# Patient Record
Sex: FEMALE | Race: WHITE | NOT HISPANIC OR LATINO | Employment: FULL TIME | URBAN - METROPOLITAN AREA
[De-identification: names, ages, dates, MRNs, and addresses within clinical notes are randomized per-mention and may not be internally consistent; named-entity substitution may affect disease eponyms.]

---

## 2023-03-08 ENCOUNTER — TELEPHONE (OUTPATIENT)
Dept: PRIMARY CARE | Facility: CLINIC | Age: 38
End: 2023-03-08

## 2023-03-08 PROBLEM — R00.2 PALPITATION: Status: ACTIVE | Noted: 2023-03-08

## 2023-03-08 PROBLEM — F17.200 NICOTINE DEPENDENCE: Status: ACTIVE | Noted: 2023-03-08

## 2023-03-08 PROBLEM — E78.5 HYPERLIPIDEMIA: Status: ACTIVE | Noted: 2023-03-08

## 2023-03-08 PROBLEM — E55.9 VITAMIN D INSUFFICIENCY: Status: ACTIVE | Noted: 2023-03-08

## 2023-03-08 PROBLEM — F41.9 ANXIETY AND DEPRESSION: Status: ACTIVE | Noted: 2023-03-08

## 2023-03-08 PROBLEM — F32.A ANXIETY AND DEPRESSION: Status: ACTIVE | Noted: 2023-03-08

## 2023-03-08 PROBLEM — J01.90 ACUTE SINUSITIS: Status: ACTIVE | Noted: 2023-03-08

## 2023-03-08 RX ORDER — ERGOCALCIFEROL 1.25 MG/1
1.25 CAPSULE ORAL
COMMUNITY
Start: 2022-12-28 | End: 2023-07-20 | Stop reason: SDUPTHER

## 2023-03-08 RX ORDER — DOXYCYCLINE HYCLATE 100 MG
100 TABLET ORAL 2 TIMES DAILY
COMMUNITY
Start: 2023-02-24 | End: 2023-06-12 | Stop reason: ALTCHOICE

## 2023-03-08 RX ORDER — TOPIRAMATE 25 MG/1
25 TABLET ORAL 2 TIMES DAILY
COMMUNITY
Start: 2023-01-29 | End: 2023-06-12 | Stop reason: SDUPTHER

## 2023-03-08 RX ORDER — ATORVASTATIN CALCIUM 20 MG/1
20 TABLET, FILM COATED ORAL NIGHTLY
COMMUNITY
Start: 2022-12-29 | End: 2023-06-12 | Stop reason: SINTOL

## 2023-03-14 RX ORDER — VENLAFAXINE HYDROCHLORIDE 75 MG/1
75 CAPSULE, EXTENDED RELEASE ORAL DAILY
COMMUNITY
Start: 2023-01-29 | End: 2023-06-12 | Stop reason: SDUPTHER

## 2023-03-14 RX ORDER — PRAVASTATIN SODIUM 40 MG/1
40 TABLET ORAL NIGHTLY
COMMUNITY
End: 2023-06-12 | Stop reason: SDUPTHER

## 2023-03-16 NOTE — TELEPHONE ENCOUNTER
When did she start having the problems? She started the Atorvastatin in December. If her stomach issues started recently, I think it may have been the antibiotic she got in urgent care that caused it.   Please clarify.   The reason I switched is that Pravastatin was not helping her Triglycerides get to goal. If she really feels she does not want to try a different statin and the symptoms started before she got sick in February, then I will send in the Pravastatin until her next appointment. Rosuvastatin or Simvastatin would be more likely to help Triglycerides than Pravastatin and would prefer to try them if the symptoms started when started the Atorvastatin.  Please clarify with her and let me know.

## 2023-06-12 ENCOUNTER — OFFICE VISIT (OUTPATIENT)
Dept: PRIMARY CARE | Facility: CLINIC | Age: 38
End: 2023-06-12
Payer: COMMERCIAL

## 2023-06-12 VITALS
DIASTOLIC BLOOD PRESSURE: 72 MMHG | WEIGHT: 236.6 LBS | TEMPERATURE: 97.5 F | RESPIRATION RATE: 17 BRPM | HEART RATE: 84 BPM | OXYGEN SATURATION: 95 % | BODY MASS INDEX: 40.39 KG/M2 | SYSTOLIC BLOOD PRESSURE: 106 MMHG | HEIGHT: 64 IN

## 2023-06-12 DIAGNOSIS — G43.109 MIGRAINE WITH AURA AND WITHOUT STATUS MIGRAINOSUS, NOT INTRACTABLE: ICD-10-CM

## 2023-06-12 DIAGNOSIS — E55.9 VITAMIN D INSUFFICIENCY: ICD-10-CM

## 2023-06-12 DIAGNOSIS — F17.219 CIGARETTE NICOTINE DEPENDENCE WITH NICOTINE-INDUCED DISORDER: ICD-10-CM

## 2023-06-12 DIAGNOSIS — E78.2 MIXED HYPERLIPIDEMIA: Primary | ICD-10-CM

## 2023-06-12 DIAGNOSIS — R79.89 ELEVATED CORTISOL LEVEL: ICD-10-CM

## 2023-06-12 DIAGNOSIS — F41.9 ANXIETY AND DEPRESSION: ICD-10-CM

## 2023-06-12 DIAGNOSIS — F32.A ANXIETY AND DEPRESSION: ICD-10-CM

## 2023-06-12 PROBLEM — J01.90 ACUTE SINUSITIS: Status: RESOLVED | Noted: 2023-03-08 | Resolved: 2023-06-12

## 2023-06-12 PROBLEM — N76.4 VULVAR FURUNCLE: Status: RESOLVED | Noted: 2023-06-12 | Resolved: 2023-06-12

## 2023-06-12 PROCEDURE — 99214 OFFICE O/P EST MOD 30 MIN: CPT | Performed by: FAMILY MEDICINE

## 2023-06-12 PROCEDURE — 4004F PT TOBACCO SCREEN RCVD TLK: CPT | Performed by: FAMILY MEDICINE

## 2023-06-12 RX ORDER — RIZATRIPTAN BENZOATE 10 MG/1
10 TABLET ORAL ONCE AS NEEDED
Qty: 9 TABLET | Refills: 2 | Status: SHIPPED | OUTPATIENT
Start: 2023-06-12 | End: 2023-07-20 | Stop reason: SINTOL

## 2023-06-12 RX ORDER — VENLAFAXINE HYDROCHLORIDE 75 MG/1
75 CAPSULE, EXTENDED RELEASE ORAL DAILY
Qty: 90 CAPSULE | Refills: 3 | Status: SHIPPED | OUTPATIENT
Start: 2023-06-12 | End: 2023-07-20 | Stop reason: DRUGHIGH

## 2023-06-12 RX ORDER — PRAVASTATIN SODIUM 40 MG/1
40 TABLET ORAL NIGHTLY
Qty: 90 TABLET | Refills: 0 | Status: SHIPPED | OUTPATIENT
Start: 2023-06-12 | End: 2023-07-20 | Stop reason: SDUPTHER

## 2023-06-12 RX ORDER — TOPIRAMATE 25 MG/1
TABLET ORAL
Qty: 90 TABLET | Refills: 0 | Status: SHIPPED | OUTPATIENT
Start: 2023-06-12 | End: 2023-07-20 | Stop reason: SDUPTHER

## 2023-06-12 ASSESSMENT — PATIENT HEALTH QUESTIONNAIRE - PHQ9
SUM OF ALL RESPONSES TO PHQ9 QUESTIONS 1 AND 2: 0
1. LITTLE INTEREST OR PLEASURE IN DOING THINGS: NOT AT ALL
2. FEELING DOWN, DEPRESSED OR HOPELESS: NOT AT ALL

## 2023-06-12 ASSESSMENT — ENCOUNTER SYMPTOMS
APPETITE CHANGE: 0
FATIGUE: 0
JOINT SWELLING: 0
TROUBLE SWALLOWING: 0
UNEXPECTED WEIGHT CHANGE: 0
HEADACHES: 1
POLYDIPSIA: 0
LIGHT-HEADEDNESS: 0
MYALGIAS: 0
NAUSEA: 0
SHORTNESS OF BREATH: 0
OCCASIONAL FEELINGS OF UNSTEADINESS: 0
LOSS OF SENSATION IN FEET: 0
POLYPHAGIA: 0
DIARRHEA: 0
VOMITING: 0
PALPITATIONS: 0
DIZZINESS: 0
DEPRESSION: 0

## 2023-06-12 ASSESSMENT — COLUMBIA-SUICIDE SEVERITY RATING SCALE - C-SSRS
6. HAVE YOU EVER DONE ANYTHING, STARTED TO DO ANYTHING, OR PREPARED TO DO ANYTHING TO END YOUR LIFE?: NO
1. IN THE PAST MONTH, HAVE YOU WISHED YOU WERE DEAD OR WISHED YOU COULD GO TO SLEEP AND NOT WAKE UP?: NO
2. HAVE YOU ACTUALLY HAD ANY THOUGHTS OF KILLING YOURSELF?: NO

## 2023-06-12 NOTE — PROGRESS NOTES
Subjective   Patient ID: Moni Bui is a 37 y.o. female who presents for Follow-up (6 month follow up and labs).    Here for 6 month follow up. Hyperlipidemia, Vit D follow up.     Hyperlipidemia - switched to Atorvastatin from Pravastatin last visit due to TG over 200. She did not like it and changes self back to Pravastatin. Atorvastatin caused constipation that was awful.     Elevated Alk phos 6 months ago, was recently postpartum. Needs repeat. Had repeat CMP, lipd and vitamin D ordered but did not get done.     Vitamin D was 24 and switched to 20235 weekly. She was to have labs done.     Migraines - on Topamax. Wants to increase it but not had labs to monitor it done. Headaches still twice a week and not on medication to treat. Taking 2 tylenol and 3 ibuprofen for them. Never been on triptan. She would like to go up on it.     Tobacco - was not ready to quit last visit. Smoking down to 1/2 ppd. Is considering quitting.     Increased cortisol - was referred to Endocrinologist in December.They did not do anything else about it. Told her to lose weight. Was seen in [place]. Told her cortisol levels would drop if lose weight. Has lost 10-12 lbs.     Obesity - she started on Weight Watchers. Is limiting carbs. Feels like she gets lot of exercise at home and with children.                  Current Outpatient Medications:     ergocalciferol (Vitamin D-2) 1.25 MG (78697 UT) capsule, Take 1 capsule (1,250 mcg) by mouth every 7 days., Disp: , Rfl:     pravastatin (Pravachol) 40 mg tablet, Take 1 tablet (40 mg) by mouth once daily at bedtime., Disp: 90 tablet, Rfl: 0    rizatriptan (Maxalt) 10 mg tablet, Take 1 tablet (10 mg) by mouth 1 time if needed for migraine. May repeat in 2 hours if unresolved. Do not exceed 30 mg in 24 hours., Disp: 9 tablet, Rfl: 2    topiramate (Topamax) 25 mg tablet, Take 1 po in am and 2 po at hs., Disp: 90 tablet, Rfl: 0    venlafaxine XR (Effexor-XR) 75 mg 24 hr capsule, Take 1 capsule  "(75 mg) by mouth once daily., Disp: 90 capsule, Rfl: 3    Patient Active Problem List   Diagnosis    Hyperlipidemia    Nicotine dependence    Palpitation    Anxiety and depression    Vitamin D insufficiency    Elevated cortisol level    Migraine with aura and without status migrainosus, not intractable         Review of Systems   Constitutional:  Negative for appetite change, fatigue and unexpected weight change.   HENT:  Negative for trouble swallowing.    Respiratory:  Negative for shortness of breath.    Cardiovascular:  Negative for chest pain, palpitations and leg swelling.   Gastrointestinal:  Negative for diarrhea, nausea and vomiting.   Endocrine: Negative for cold intolerance, heat intolerance, polydipsia, polyphagia and polyuria.   Musculoskeletal:  Negative for joint swelling and myalgias.   Skin:  Negative for rash.   Neurological:  Positive for headaches. Negative for dizziness and light-headedness.       Objective   /72 (BP Location: Right arm, Patient Position: Sitting, BP Cuff Size: Large adult)   Pulse 84   Temp 36.4 °C (97.5 °F)   Resp 17   Ht 1.626 m (5' 4\")   Wt 107 kg (236 lb 9.6 oz)   SpO2 95%   BMI 40.61 kg/m²     Physical Exam  Vitals reviewed.   Constitutional:       Appearance: Normal appearance.   Pulmonary:      Effort: Pulmonary effort is normal.   Neurological:      Mental Status: She is alert.   Psychiatric:         Mood and Affect: Mood normal.         Behavior: Behavior normal.         Assessment/Plan   Problem List Items Addressed This Visit          Endocrine/Metabolic    Vitamin D insufficiency     Took 50k weekly for a while, has been out. Repeat level.          Relevant Orders    Vitamin D 1,25 Dihydroxy       Other    Hyperlipidemia - Primary     Did not tolerate Atorvastatin 20 mg due to constipation. Changed herself back to Pravastatin 40. Will recheck labs and follow up in one month.          Relevant Medications    pravastatin (Pravachol) 40 mg tablet    Other " Relevant Orders    Lipid Panel    Nicotine dependence     She is trying to cut back and considering quitting.          Anxiety and depression    Relevant Medications    venlafaxine XR (Effexor-XR) 75 mg 24 hr capsule    Elevated cortisol level     Saw endocrinology but no follow up. Repeat level.          Relevant Orders    Cortisol    Migraine with aura and without status migrainosus, not intractable     She is some better on Topamax. Still with some bad headaches. Discussed role of sleep. She will get labs, Increase to 1 hs and one am and follow up in 4 weeks.          Relevant Medications    topiramate (Topamax) 25 mg tablet    rizatriptan (Maxalt) 10 mg tablet    Other Relevant Orders    CBC and Auto Differential    Comprehensive Metabolic Panel    Uric Acid         Assessment, plans, tests, and follow up discussed with patient and patient verbalized understanding. Moni was given an opportunity to ask questions and  any concerns were addressed including but not limited to med changes, lab monitoring and follow up. .

## 2023-06-12 NOTE — ASSESSMENT & PLAN NOTE
She is some better on Topamax. Still with some bad headaches. Discussed role of sleep. She will get labs, Increase to 1 hs and one am and follow up in 4 weeks.

## 2023-06-12 NOTE — PATIENT INSTRUCTIONS
Increase Topamax to 2 at bedtime and one in the am.     Rizatriptan for headache. One at onset of headache, repeat in 2 hours if not better. No more than 2 days a week.     Have labs done.     Follow up in one month.

## 2023-06-12 NOTE — ASSESSMENT & PLAN NOTE
Did not tolerate Atorvastatin 20 mg due to constipation. Changed herself back to Pravastatin 40. Will recheck labs and follow up in one month.

## 2023-06-29 LAB
CHLAMYDIA TRACH., AMPLIFIED: NEGATIVE
N. GONORRHEA, AMPLIFIED: NEGATIVE

## 2023-07-13 ENCOUNTER — LAB (OUTPATIENT)
Dept: LAB | Facility: LAB | Age: 38
End: 2023-07-13
Payer: COMMERCIAL

## 2023-07-13 ENCOUNTER — APPOINTMENT (OUTPATIENT)
Dept: PRIMARY CARE | Facility: CLINIC | Age: 38
End: 2023-07-13
Payer: COMMERCIAL

## 2023-07-13 DIAGNOSIS — R79.89 ELEVATED CORTISOL LEVEL: ICD-10-CM

## 2023-07-13 DIAGNOSIS — E78.2 MIXED HYPERLIPIDEMIA: ICD-10-CM

## 2023-07-13 DIAGNOSIS — G43.109 MIGRAINE WITH AURA AND WITHOUT STATUS MIGRAINOSUS, NOT INTRACTABLE: ICD-10-CM

## 2023-07-13 DIAGNOSIS — E55.9 VITAMIN D INSUFFICIENCY: ICD-10-CM

## 2023-07-13 LAB
ALANINE AMINOTRANSFERASE (SGPT) (U/L) IN SER/PLAS: 22 U/L (ref 7–45)
ALBUMIN (G/DL) IN SER/PLAS: 4 G/DL (ref 3.4–5)
ALKALINE PHOSPHATASE (U/L) IN SER/PLAS: 106 U/L (ref 33–110)
ANION GAP IN SER/PLAS: 12 MMOL/L (ref 10–20)
ASPARTATE AMINOTRANSFERASE (SGOT) (U/L) IN SER/PLAS: 14 U/L (ref 9–39)
BASOPHILS (10*3/UL) IN BLOOD BY AUTOMATED COUNT: 0.04 X10E9/L (ref 0–0.1)
BASOPHILS/100 LEUKOCYTES IN BLOOD BY AUTOMATED COUNT: 0.5 % (ref 0–2)
BILIRUBIN TOTAL (MG/DL) IN SER/PLAS: 0.3 MG/DL (ref 0–1.2)
CALCIUM (MG/DL) IN SER/PLAS: 8.7 MG/DL (ref 8.6–10.3)
CARBON DIOXIDE, TOTAL (MMOL/L) IN SER/PLAS: 24 MMOL/L (ref 21–32)
CHLORIDE (MMOL/L) IN SER/PLAS: 109 MMOL/L (ref 98–107)
CHOLESTEROL (MG/DL) IN SER/PLAS: 189 MG/DL (ref 0–199)
CHOLESTEROL IN HDL (MG/DL) IN SER/PLAS: 40.5 MG/DL
CHOLESTEROL/HDL RATIO: 4.7
CORTISOL (UG/DL) IN SERUM: 22.9 UG/DL (ref 2.5–20)
CREATININE (MG/DL) IN SER/PLAS: 0.7 MG/DL (ref 0.5–1.05)
EOSINOPHILS (10*3/UL) IN BLOOD BY AUTOMATED COUNT: 0.47 X10E9/L (ref 0–0.7)
EOSINOPHILS/100 LEUKOCYTES IN BLOOD BY AUTOMATED COUNT: 6.1 % (ref 0–6)
ERYTHROCYTE DISTRIBUTION WIDTH (RATIO) BY AUTOMATED COUNT: 13.3 % (ref 11.5–14.5)
ERYTHROCYTE MEAN CORPUSCULAR HEMOGLOBIN CONCENTRATION (G/DL) BY AUTOMATED: 32.4 G/DL (ref 32–36)
ERYTHROCYTE MEAN CORPUSCULAR VOLUME (FL) BY AUTOMATED COUNT: 91 FL (ref 80–100)
ERYTHROCYTES (10*6/UL) IN BLOOD BY AUTOMATED COUNT: 4.54 X10E12/L (ref 4–5.2)
GFR FEMALE: >90 ML/MIN/1.73M2
GLUCOSE (MG/DL) IN SER/PLAS: 88 MG/DL (ref 74–99)
HEMATOCRIT (%) IN BLOOD BY AUTOMATED COUNT: 41.4 % (ref 36–46)
HEMOGLOBIN (G/DL) IN BLOOD: 13.4 G/DL (ref 12–16)
IMMATURE GRANULOCYTES/100 LEUKOCYTES IN BLOOD BY AUTOMATED COUNT: 0.4 % (ref 0–0.9)
LDL: 119 MG/DL (ref 0–99)
LEUKOCYTES (10*3/UL) IN BLOOD BY AUTOMATED COUNT: 7.8 X10E9/L (ref 4.4–11.3)
LYMPHOCYTES (10*3/UL) IN BLOOD BY AUTOMATED COUNT: 2.23 X10E9/L (ref 1.2–4.8)
LYMPHOCYTES/100 LEUKOCYTES IN BLOOD BY AUTOMATED COUNT: 28.8 % (ref 13–44)
MONOCYTES (10*3/UL) IN BLOOD BY AUTOMATED COUNT: 0.57 X10E9/L (ref 0.1–1)
MONOCYTES/100 LEUKOCYTES IN BLOOD BY AUTOMATED COUNT: 7.4 % (ref 2–10)
NEUTROPHILS (10*3/UL) IN BLOOD BY AUTOMATED COUNT: 4.41 X10E9/L (ref 1.2–7.7)
NEUTROPHILS/100 LEUKOCYTES IN BLOOD BY AUTOMATED COUNT: 56.8 % (ref 40–80)
PLATELETS (10*3/UL) IN BLOOD AUTOMATED COUNT: 375 X10E9/L (ref 150–450)
POTASSIUM (MMOL/L) IN SER/PLAS: 4.9 MMOL/L (ref 3.5–5.3)
PROTEIN TOTAL: 6.5 G/DL (ref 6.4–8.2)
SODIUM (MMOL/L) IN SER/PLAS: 140 MMOL/L (ref 136–145)
TRIGLYCERIDE (MG/DL) IN SER/PLAS: 147 MG/DL (ref 0–149)
URATE (MG/DL) IN SER/PLAS: 5.2 MG/DL (ref 2.3–6.7)
UREA NITROGEN (MG/DL) IN SER/PLAS: 15 MG/DL (ref 6–23)
VLDL: 29 MG/DL (ref 0–40)

## 2023-07-13 PROCEDURE — 80061 LIPID PANEL: CPT

## 2023-07-13 PROCEDURE — 36415 COLL VENOUS BLD VENIPUNCTURE: CPT

## 2023-07-13 PROCEDURE — 82533 TOTAL CORTISOL: CPT

## 2023-07-13 PROCEDURE — 85025 COMPLETE CBC W/AUTO DIFF WBC: CPT

## 2023-07-13 PROCEDURE — 84550 ASSAY OF BLOOD/URIC ACID: CPT

## 2023-07-13 PROCEDURE — 80053 COMPREHEN METABOLIC PANEL: CPT

## 2023-07-13 PROCEDURE — 82652 VIT D 1 25-DIHYDROXY: CPT

## 2023-07-17 LAB — VITAMIN D 1,25-DIHYDROXY: 42 PG/ML (ref 19.9–79.3)

## 2023-07-20 ENCOUNTER — OFFICE VISIT (OUTPATIENT)
Dept: PRIMARY CARE | Facility: CLINIC | Age: 38
End: 2023-07-20
Payer: COMMERCIAL

## 2023-07-20 VITALS
SYSTOLIC BLOOD PRESSURE: 112 MMHG | OXYGEN SATURATION: 96 % | HEART RATE: 82 BPM | BODY MASS INDEX: 40.39 KG/M2 | HEIGHT: 64 IN | DIASTOLIC BLOOD PRESSURE: 78 MMHG | TEMPERATURE: 97 F | WEIGHT: 236.6 LBS

## 2023-07-20 DIAGNOSIS — E55.9 VITAMIN D INSUFFICIENCY: ICD-10-CM

## 2023-07-20 DIAGNOSIS — E78.2 MIXED HYPERLIPIDEMIA: Primary | ICD-10-CM

## 2023-07-20 DIAGNOSIS — M54.31 SCIATICA OF RIGHT SIDE: ICD-10-CM

## 2023-07-20 DIAGNOSIS — Z11.59 ENCOUNTER FOR HEPATITIS C SCREENING TEST FOR LOW RISK PATIENT: ICD-10-CM

## 2023-07-20 DIAGNOSIS — G43.109 MIGRAINE WITH AURA AND WITHOUT STATUS MIGRAINOSUS, NOT INTRACTABLE: ICD-10-CM

## 2023-07-20 DIAGNOSIS — R79.89 ELEVATED CORTISOL LEVEL: ICD-10-CM

## 2023-07-20 DIAGNOSIS — F41.9 ANXIETY AND DEPRESSION: ICD-10-CM

## 2023-07-20 DIAGNOSIS — F32.A ANXIETY AND DEPRESSION: ICD-10-CM

## 2023-07-20 DIAGNOSIS — M54.31 SCIATICA, RIGHT SIDE: ICD-10-CM

## 2023-07-20 PROCEDURE — 4004F PT TOBACCO SCREEN RCVD TLK: CPT | Performed by: FAMILY MEDICINE

## 2023-07-20 PROCEDURE — 99214 OFFICE O/P EST MOD 30 MIN: CPT | Performed by: FAMILY MEDICINE

## 2023-07-20 RX ORDER — NAPROXEN 500 MG/1
500 TABLET ORAL
Qty: 60 TABLET | Refills: 1 | Status: SHIPPED | OUTPATIENT
Start: 2023-07-20 | End: 2023-09-18

## 2023-07-20 RX ORDER — ERGOCALCIFEROL 1.25 MG/1
1.25 CAPSULE ORAL
Qty: 12 CAPSULE | Refills: 1 | Status: SHIPPED | OUTPATIENT
Start: 2023-07-20 | End: 2024-01-16

## 2023-07-20 RX ORDER — PRAVASTATIN SODIUM 40 MG/1
40 TABLET ORAL NIGHTLY
Qty: 90 TABLET | Refills: 3 | Status: SHIPPED | OUTPATIENT
Start: 2023-07-20 | End: 2024-04-02 | Stop reason: WASHOUT

## 2023-07-20 RX ORDER — VENLAFAXINE HYDROCHLORIDE 150 MG/1
150 CAPSULE, EXTENDED RELEASE ORAL DAILY
Qty: 90 CAPSULE | Refills: 3 | Status: SHIPPED | OUTPATIENT
Start: 2023-07-20 | End: 2024-03-12 | Stop reason: DRUGHIGH

## 2023-07-20 RX ORDER — TOPIRAMATE 25 MG/1
TABLET ORAL
Qty: 90 TABLET | Refills: 1 | Status: SHIPPED | OUTPATIENT
Start: 2023-07-20 | End: 2023-10-02 | Stop reason: SDUPTHER

## 2023-07-20 NOTE — ASSESSMENT & PLAN NOTE
Did not tolerate Troptan. Made her feel funny, numbing feeling in throat. Throat was itchy inside.   Increased Topamax last. Is on 2 am and 1 pm. Migraines are much better. Much less frequent.

## 2023-07-20 NOTE — PROGRESS NOTES
"Subjective   Patient ID: Moni Bui is a 38 y.o. female who presents for Follow-up (Having some sciatic nerve pain and ears feel clogged ).    Here to follow up. Seen 6/12 and had not had labs done prior.     Was to have Cholesterol, Vitt D and labs for topamak rechecked.     She was referred to Endocrinology in December for elevated cortisol. They told her cortisol would drop if lost weight. Has lost 10 lbs.     She had vitamin D deficiency. On 01135 weekly.     Anxiety and depression - on 75 mg daily. She feels like need to increase. Has been more irritable. Brother moved in with her after being in senior living for 6 years. She is having to drive him to work. He is on house arrest. And that is a lot of stress for her.     Got puppy on top of it. She saved him from her sister wanting to get rid of it.     She has been having issues with sciatic nerve worse. Needs something for pain.     Would like ears checked.              Current Outpatient Medications:     ergocalciferol (Vitamin D-2) 1.25 MG (08594 UT) capsule, Take 1 capsule (1,250 mcg) by mouth every 7 days., Disp: 12 capsule, Rfl: 1    pravastatin (Pravachol) 40 mg tablet, Take 1 tablet (40 mg) by mouth once daily at bedtime., Disp: 90 tablet, Rfl: 3    topiramate (Topamax) 25 mg tablet, Take 1 po in am and 2 po at hs., Disp: 90 tablet, Rfl: 1    venlafaxine XR (Effexor-XR) 150 mg 24 hr capsule, Take 1 capsule (150 mg) by mouth once daily. Take with food., Disp: 90 capsule, Rfl: 3    Patient Active Problem List   Diagnosis    Hyperlipidemia    Nicotine dependence    Palpitation    Anxiety and depression    Vitamin D insufficiency    Elevated cortisol level    Migraine with aura and without status migrainosus, not intractable         Review of Systems    Objective   /78   Pulse 82   Temp 36.1 °C (97 °F)   Ht 1.626 m (5' 4\")   Wt 107 kg (236 lb 9.6 oz)   SpO2 96%   BMI 40.61 kg/m²     Physical Exam  Vitals reviewed.   Constitutional:       " Appearance: Normal appearance.   Pulmonary:      Effort: Pulmonary effort is normal.   Neurological:      Mental Status: She is alert.   Psychiatric:         Mood and Affect: Mood normal.         Behavior: Behavior normal.       Recent Results (from the past 1008 hour(s))   CYTOLOGY GYN RESULTS    Collection Time: 06/27/23 10:46 AM   Result Value Ref Range    Pathology Report           Accession #: [id number]     Date of Procedure:  6/27/2023       Pathologist: [facility], Cytology  Date Reported: 7/5/2023  Date Received:  6/27/2023  Submitting Physician: MATHEUS VICK MD  Attending Physician: MATHEUS VICK MD                           FINAL CYTOLOGICAL INTERPRETATION        A.  THINPREP PAP CERVICAL:              Specimen adequacy:       SATISFACTORY FOR EVALUATION.       Quality Indicator: Endocervical/transformation zone component is present.       Quality Indicator: Partially obscuring inflammation.              General Categorization:       NEGATIVE FOR INTRAEPITHELIAL LESION OR MALIGNANCY.                            HIGH RISK HPV TEST RESULT:                       HPV GENOTYPE  16                      NEGATIVE       HPV GENOTYPE  18                      NEGATIVE       HPV GENOTYPE  OTHER             NEGATIVE              Reference Range: Negative                  Slide(s) initially screened by a Cytotechnologist at [facility], [address] [place] [id number]    Testing for high-risk (HR) type of human papilloma virus (HPV) is performed by  the Roche melissa HPV Test.  The melissa HPV Test is a qualitative polymerase chain  reaction that amplifies DNA of HPV16, HPV18 and 12 other high-risk HPV types  (31, 33, 35, 39, 45, 51, 52, 56, 58, 59, 66, and 68) associated with cervical  cancer and its precursor lesions.  A positive result indicates the presence of  HPV DNA due to one or more of the 14 genotypes: 16, 18, 31, 33, 35, 39, 45,  51,  52, 56, 58, 59, 66, and 68. Negative results indicate HPV DNA concentrations  are undetectable or below the pre-set threshold for detection. False negative  results may be associated with unoptimized sampling. A negative HR HPV result  does not exclude the possibility of future cytologic HSIL or underlying CIN2-3  or cancer.    This test is approved for cervical specimens by  the US Food and Drug  Administration. Results of this  test should be interpreted in conjunction with  the patient’s Pap test results.  Please refer to ASCCP current guidelines for  the use of HPV DNA testing, result interpretation, and patient management.   The performance of this test was verified by the Molecular Diagnostic  Laboratory at [facility]. The lab is  certified under the Clinical Laboratory Amendments of 1988 (CLIA 88) as  qualified to perform high complexity clinical laboratory testing.    This specimen has been analyzed by the iMemoriesPrep Imaging System ([employer]  an automated imaging and review system, which assists the laboratory in  evaluating cells on ThinPrep Pap tests. Following automated imaging, selected  fields from every slide were reviewed by a cytotechnologist and/or pathologist.    Electronically Signed Out By [facility], Cytology//LSM   By the signature on this report, the individual or group listed as making the  Final Interpretation/Diagnosis cer tifies that they have reviewed this case.  Diagnostic interpretation performed at [facility] [address] [place] [address]  Educational Note:  Cervical cytology is a screening procedure primarily for squamous cancers and  precursors and has associated false-negative and false-positive results as  evidenced by published data.  Your patient’s test should be interpreted in this  context, together with patient’s history and clinical findings.  Regular  sampling and follow-up of unexplained  clinical signs and symptoms are  recommended to minimize false negative results.         Clinical History  Date of Last Menstrual Period:     Unknown    Other Clinical Conditions:  HPV Test for All Interpretations - Include HPV Genotype       GC / CT Testing Ordered    Additional Testing: GC + Chlamydia Testing  Clinical Diagnosis History: Screen for STD (sexually transmitted disease) -  (Z11.3); Screening for cervical cancer - (Z12.4); Screening for HPV (human  papillomavirus) - (Z11. 51)   Source of Specimen  A: THINPREP PAP CERVICAL            [facility]  Department of Pathology   [address] [place] [address]       C. Trachomatis / N. Gonorrhoeae, Amplified Detection    Collection Time: 06/28/23 10:46 AM   Result Value Ref Range    Neisseria gonorrhea,Amplified NEGATIVE Negative    Chlamydia trachomatis, Amplified NEGATIVE Negative   CBC and Auto Differential    Collection Time: 07/13/23  8:46 AM   Result Value Ref Range    WBC 7.8 4.4 - 11.3 x10E9/L    RBC 4.54 4.00 - 5.20 x10E12/L    Hemoglobin 13.4 12.0 - 16.0 g/dL    Hematocrit 41.4 36.0 - 46.0 %    MCV 91 80 - 100 fL    MCHC 32.4 32.0 - 36.0 g/dL    Platelets 375 150 - 450 x10E9/L    RDW 13.3 11.5 - 14.5 %    Neutrophils % 56.8 40.0 - 80.0 %    Immature Granulocytes %, Automated 0.4 0.0 - 0.9 %    Lymphocytes % 28.8 13.0 - 44.0 %    Monocytes % 7.4 2.0 - 10.0 %    Eosinophils % 6.1 0.0 - 6.0 %    Basophils % 0.5 0.0 - 2.0 %    Neutrophils Absolute 4.41 1.20 - 7.70 x10E9/L    Lymphocytes Absolute 2.23 1.20 - 4.80 x10E9/L    Monocytes Absolute 0.57 0.10 - 1.00 x10E9/L    Eosinophils Absolute 0.47 0.00 - 0.70 x10E9/L    Basophils Absolute 0.04 0.00 - 0.10 x10E9/L   Comprehensive Metabolic Panel    Collection Time: 07/13/23  8:46 AM   Result Value Ref Range    Glucose 88 74 - 99 mg/dL    Sodium 140 136 - 145 mmol/L    Potassium 4.9 3.5 - 5.3 mmol/L    Chloride 109 (H) 98 - 107 mmol/L    Bicarbonate 24 21 - 32 mmol/L     Anion Gap 12 10 - 20 mmol/L    Urea Nitrogen 15 6 - 23 mg/dL    Creatinine 0.70 0.50 - 1.05 mg/dL    GFR Female >90 >90 mL/min/1.73m2    Calcium 8.7 8.6 - 10.3 mg/dL    Albumin 4.0 3.4 - 5.0 g/dL    Alkaline Phosphatase 106 33 - 110 U/L    Total Protein 6.5 6.4 - 8.2 g/dL    AST 14 9 - 39 U/L    Total Bilirubin 0.3 0.0 - 1.2 mg/dL    ALT (SGPT) 22 7 - 45 U/L   Uric Acid    Collection Time: 07/13/23  8:46 AM   Result Value Ref Range    Uric Acid 5.2 2.3 - 6.7 mg/dL   Vitamin D 1,25 Dihydroxy    Collection Time: 07/13/23  8:46 AM   Result Value Ref Range    Vit D, 1,25-Dihydroxy 42.0 19.9 - 79.3 pg/mL   Lipid Panel    Collection Time: 07/13/23  8:46 AM   Result Value Ref Range    Cholesterol 189 0 - 199 mg/dL    HDL 40.5 mg/dL    Cholesterol/HDL Ratio 4.7      (H) 0 - 99 mg/dL    VLDL 29 0 - 40 mg/dL    Triglycerides 147 0 - 149 mg/dL   Cortisol    Collection Time: 07/13/23  8:46 AM   Result Value Ref Range    Cortisol 22.9 (H) 2.5 - 20.0 ug/dL           Assessment/Plan   Problem List Items Addressed This Visit       Hyperlipidemia - Primary     . Nutrition referral offered. She declines. Exercise and diet are keys. Recheck labs in 6 months.          Relevant Medications    pravastatin (Pravachol) 40 mg tablet    Anxiety and depression     Increased Venlafaxine to 150 mg daily. Follow up in 6 months, sooner if not doing better.          Relevant Medications    venlafaxine XR (Effexor-XR) 150 mg 24 hr capsule    Vitamin D insufficiency     On 50k q week, level done in interim is 42. Continue for 6 months and recheclk labs.          Relevant Medications    ergocalciferol (Vitamin D-2) 1.25 MG (38667 UT) capsule    Elevated cortisol level    Migraine with aura and without status migrainosus, not intractable     Did not tolerate Troptan. Made her feel funny, numbing feeling in throat. Throat was itchy inside.   Increased Topamax last. Is on 2 am and 1 pm. Migraines are much better. Much less frequent.           Relevant Medications    topiramate (Topamax) 25 mg tablet         Assessment, plans, tests, and follow up discussed with patient and patient verbalized understanding. Moni was given an opportunity to ask questions and  any concerns were addressed including but not limited to medication changes. .

## 2023-07-20 NOTE — PATIENT INSTRUCTIONS
Increase Venlafaxine to 150 mg daily. Call me if not doing well on it.     Continue Topamax and Vitamin D at current does.   Will continue Pravastatin for now, likely may change it if not improved at labs in 6 months.

## 2023-07-20 NOTE — ASSESSMENT & PLAN NOTE
. Nutrition referral offered. She declines. Exercise and diet are keys. Recheck labs in 6 months.

## 2023-08-15 ENCOUNTER — HOSPITAL ENCOUNTER (OUTPATIENT)
Dept: DATA CONVERSION | Facility: HOSPITAL | Age: 38
End: 2023-08-15
Attending: OBSTETRICS & GYNECOLOGY | Admitting: OBSTETRICS & GYNECOLOGY
Payer: COMMERCIAL

## 2023-08-15 DIAGNOSIS — Z30.2 ENCOUNTER FOR STERILIZATION: ICD-10-CM

## 2023-08-15 LAB
BASOPHILS (10*3/UL) IN BLOOD BY AUTOMATED COUNT: 0.06 X10E9/L (ref 0–0.1)
BASOPHILS/100 LEUKOCYTES IN BLOOD BY AUTOMATED COUNT: 0.7 % (ref 0–2)
EOSINOPHILS (10*3/UL) IN BLOOD BY AUTOMATED COUNT: 0.47 X10E9/L (ref 0–0.7)
EOSINOPHILS/100 LEUKOCYTES IN BLOOD BY AUTOMATED COUNT: 5.7 % (ref 0–6)
ERYTHROCYTE DISTRIBUTION WIDTH (RATIO) BY AUTOMATED COUNT: 13.2 % (ref 11.5–14.5)
ERYTHROCYTE MEAN CORPUSCULAR HEMOGLOBIN CONCENTRATION (G/DL) BY AUTOMATED: 32.9 G/DL (ref 32–36)
ERYTHROCYTE MEAN CORPUSCULAR VOLUME (FL) BY AUTOMATED COUNT: 90 FL (ref 80–100)
ERYTHROCYTES (10*6/UL) IN BLOOD BY AUTOMATED COUNT: 4.62 X10E12/L (ref 4–5.2)
HEMATOCRIT (%) IN BLOOD BY AUTOMATED COUNT: 41.6 % (ref 36–46)
HEMOGLOBIN (G/DL) IN BLOOD: 13.7 G/DL (ref 12–16)
IMMATURE GRANULOCYTES/100 LEUKOCYTES IN BLOOD BY AUTOMATED COUNT: 0.2 % (ref 0–0.9)
LEUKOCYTES (10*3/UL) IN BLOOD BY AUTOMATED COUNT: 8.2 X10E9/L (ref 4.4–11.3)
LYMPHOCYTES (10*3/UL) IN BLOOD BY AUTOMATED COUNT: 2.16 X10E9/L (ref 1.2–4.8)
LYMPHOCYTES/100 LEUKOCYTES IN BLOOD BY AUTOMATED COUNT: 26.3 % (ref 13–44)
MONOCYTES (10*3/UL) IN BLOOD BY AUTOMATED COUNT: 0.52 X10E9/L (ref 0.1–1)
MONOCYTES/100 LEUKOCYTES IN BLOOD BY AUTOMATED COUNT: 6.3 % (ref 2–10)
NEUTROPHILS (10*3/UL) IN BLOOD BY AUTOMATED COUNT: 4.97 X10E9/L (ref 1.2–7.7)
NEUTROPHILS/100 LEUKOCYTES IN BLOOD BY AUTOMATED COUNT: 60.8 % (ref 40–80)
PLATELETS (10*3/UL) IN BLOOD AUTOMATED COUNT: 333 X10E9/L (ref 150–450)

## 2023-08-16 LAB — HCG, URINE: NEGATIVE

## 2023-08-23 LAB
COMPLETE PATHOLOGY REPORT: NORMAL
CONVERTED CLINICAL DIAGNOSIS-HISTORY: NORMAL
CONVERTED FINAL DIAGNOSIS: NORMAL
CONVERTED FINAL REPORT PDF LINK TO COPY AND PASTE: NORMAL
CONVERTED GROSS DESCRIPTION: NORMAL

## 2023-09-29 VITALS — WEIGHT: 225.97 LBS | HEIGHT: 63 IN | BODY MASS INDEX: 40.04 KG/M2

## 2023-09-30 NOTE — H&P
History of Present Illness:   Pregnant/Lactating:  ·  Are You Pregnant no (1)   ·  Are You Currently Breastfeeding no (1)     History Present Illness:  Reason for surgery: undesired fertility   HPI:    38 year old female presents for bilateral salpingectomy for permanent sterilization.     Past medical history: anxiety, obesity  Past surgical history: cholecystectomy  Allergies: Augmentin, wellbutrin    Allergies:        Allergies:  ·  Wellbutrin SR : Unknown  ·  Augmentin : Unknown    Home Medication Review:   Home Medications Reviewed: yes     Impression/Procedure:   ·  Impression and Planned Procedure: 38 year old female for laparoscopic bilateral salpingectomy for permanent sterilization.       ERAS (Enhanced Recovery After Surgery):  ·  ERAS Patient: yes   ·  CPM/PAT Utilization: yes   ·  Immunonutrition Recovery Drink Utilization: no   ·  Carbohydrate Supplement Drink Utilization: no     Review of Systems:   Review of Systems:  Constitutional: NEGATIVE: Fever, Chills     Eyes: NEGATIVE: Drainage     ENMT: NEGATIVE: Nasal Discharge     Respiratory: NEGATIVE: Dry Cough     Cardiac: NEGATIVE: Orthopnea     Gastrointestinal: NEGATIVE: Nausea, Abdominal Pain     Genitourinary: NEGATIVE: Dysuria     Musculoskeletal: NEGATIVE: Pain     Neurological: NEGATIVE: Headache     Psychiatric: NEGATIVE: Anxiety     Skin: NEGATIVE: Pain     Endocrine: NEGATIVE: Sweat     Hematologic/Lymph: NEGATIVE: Anemia     Allergic/Immunologic: NEGATIVE: Itching     Breast: NEGATIVE: Pain         Physical Exam by System:    Constitutional: Well developed, awake/alert/oriented  x3, no distress, alert and cooperative   Eyes: PERRL, EOMI, clear sclera   Head/Neck: Neck supple, no apparent injury, thyroid  without mass or tenderness   Respiratory/Thorax: Patent airways, CTAB, normal  breath sounds with good chest expansion, thorax symmetric   Cardiovascular: Regular, rate and rhythm, no murmurs,  2+ equal pulses of the extremities, normal  "S 1and S 2   Gastrointestinal: Nondistended, soft, non-tender,  no rebound tenderness or guarding, no masses palpable, no organomegaly   Musculoskeletal: ROM intact, no joint swelling, normal  strength   Neurological: alert and oriented x3, intact senses,  motor, response and reflexes, normal strength   Psychological: Appropriate mood and behavior   Skin: no rash     Consent:   COVID-19 Consent:  ·  COVID-19 Risk Consent Surgeon has reviewed key risks related to the risk of adelia COVID-19 and if they contract COVID-19 what the risks are.       Electronic Signatures:  Kadi Kilgore)  (Signed 15-Aug-2023 09:41)   Authored: History of Present Illness, Allergies, Home  Medication Review, Impression/Procedure, ERAS, Review of Systems, Physical Exam, Consent, Note Completion      Last Updated: 15-Aug-2023 09:41 by Kadi Kilgore (MD)    References:  1.  Data Referenced From \"Patient Profile - Preop v3\" 15-Aug-2023 09:17   "

## 2023-10-01 NOTE — OP NOTE
PROCEDURE DETAILS    Preoperative Diagnosis:  Request for sterilization, Z30.2    Postoperative Diagnosis:  Request for sterilization, Z30.2    Surgeon: Kadi Kilgore  Resident/Fellow/Other Assistant: Penny Drake    Procedure:  1.  Laparoscopic bilateral salpingectomy    Anesthesia: No anesthesiologist associated with this case  Estimated Blood Loss: 2  Findings: Normal appearing uterus and ovaries. Both tubes were adherent to the respective ovary with additional adhesion of left ovary to side wall.   Specimens(s) Collected: yes,  Bilateral tubes   Complications: none  Urine Output: 200        Operative Report:   Patient was brought to the operating where safety huddle was completed. General anesthesia was obtained. Patient was placed in lithotomy position and prepped  and draped. Osborne catheter was placed in the bladder. Weighted speculum was placed in the vagina. The anterior lip of the cervix was grasped with tenaculum. The uterus was sounded to 7 cm then the Zumi uterine manipulator was inserted. Other instruments  were removed. Gloves were changed.  Attention was turned to the patient's abdomen. A small infraumbilical incision was made with the scalpel. The Veress needle was inserted and intra-abdominal placement confirmed with a hanging drop test. Insufflation was performed. The needle was withdrawn  and a 5 mm trocar inserted under direct visualization. In the patient's left lower quadrant a 8 mm incision was made and 8 mm trocar inserted under direct visualization. In the patient's right lower quadrant a 5 mm trocar was inserted under direct visualization  through a stab incision.   Survey of the abdomen and pelvis revealed a normal appearing uterus and ovaries. The left ovary was adherent to the left sidewall. This adhesion  was lysed The tube was also adherent to the ovary and was carefully dissected free. The left mesosalpinx was transected and the tube amputated and withdrawn from the  abdomen. This procedure was repeated on the right with adhesions of the tube to the ovary  lysed followed by the mesosalpinx transected with LigaSure device and tube amputated and removed. All pedicles were noted to be hemostatic. Insufflation was released. Trocars were removed. All skin incisions were closed with 4-0 Vicryl. Vaginal instruments  and Osborne catheter were removed. Patient was taken awake and in stable condition to recovery.                         Attestation:   Note Completion:  Attending Attestation I performed the procedure without a resident         Electronic Signatures:  Kadi Kilgore)  (Signed 15-Aug-2023 11:13)   Authored: Post-Operative Note, Chart Review, Note Completion      Last Updated: 15-Aug-2023 11:13 by Kadi Kilgore)

## 2023-10-02 ENCOUNTER — TELEPHONE (OUTPATIENT)
Dept: PRIMARY CARE | Facility: CLINIC | Age: 38
End: 2023-10-02
Payer: COMMERCIAL

## 2023-10-02 DIAGNOSIS — G43.109 MIGRAINE WITH AURA AND WITHOUT STATUS MIGRAINOSUS, NOT INTRACTABLE: ICD-10-CM

## 2023-10-02 RX ORDER — TOPIRAMATE 25 MG/1
TABLET ORAL
Qty: 90 TABLET | Refills: 3 | Status: SHIPPED | OUTPATIENT
Start: 2023-10-02

## 2023-10-02 NOTE — TELEPHONE ENCOUNTER
Rx Refill Request Telephone Encounter    Name:  Moni Bui  :  [id number]  Medication Name:  Topiramate   25 mg   Route : oral  Frequency : twice a day  90  take one tab in am and 2 tabs at HS  Specific Pharmacy location:  [facility]  Date of last appointment:    Date of next appointment:  2024  Best number to reach patient:  [phone]    Patient is out of this medication

## 2024-01-22 ENCOUNTER — APPOINTMENT (OUTPATIENT)
Dept: PRIMARY CARE | Facility: CLINIC | Age: 39
End: 2024-01-22
Payer: COMMERCIAL

## 2024-03-12 ENCOUNTER — OFFICE VISIT (OUTPATIENT)
Dept: PRIMARY CARE | Facility: CLINIC | Age: 39
End: 2024-03-12
Payer: COMMERCIAL

## 2024-03-12 VITALS
HEART RATE: 95 BPM | HEIGHT: 65 IN | OXYGEN SATURATION: 95 % | WEIGHT: 243 LBS | SYSTOLIC BLOOD PRESSURE: 124 MMHG | BODY MASS INDEX: 40.48 KG/M2 | DIASTOLIC BLOOD PRESSURE: 70 MMHG

## 2024-03-12 DIAGNOSIS — F41.9 ANXIETY AND DEPRESSION: ICD-10-CM

## 2024-03-12 DIAGNOSIS — E78.5 HYPERLIPIDEMIA LDL GOAL <100: Primary | ICD-10-CM

## 2024-03-12 DIAGNOSIS — E66.01 MORBID OBESITY WITH BMI OF 40.0-44.9, ADULT (MULTI): ICD-10-CM

## 2024-03-12 DIAGNOSIS — Z00.00 HEALTHCARE MAINTENANCE: ICD-10-CM

## 2024-03-12 DIAGNOSIS — Z72.0 TOBACCO ABUSE: ICD-10-CM

## 2024-03-12 DIAGNOSIS — S46.912D STRAIN OF LEFT SHOULDER, SUBSEQUENT ENCOUNTER: ICD-10-CM

## 2024-03-12 DIAGNOSIS — F32.A ANXIETY AND DEPRESSION: ICD-10-CM

## 2024-03-12 PROCEDURE — 99214 OFFICE O/P EST MOD 30 MIN: CPT | Performed by: FAMILY MEDICINE

## 2024-03-12 PROCEDURE — 4004F PT TOBACCO SCREEN RCVD TLK: CPT | Performed by: FAMILY MEDICINE

## 2024-03-12 PROCEDURE — 3008F BODY MASS INDEX DOCD: CPT | Performed by: FAMILY MEDICINE

## 2024-03-12 RX ORDER — VARENICLINE TARTRATE 1 MG/1
1 TABLET, FILM COATED ORAL 2 TIMES DAILY
Qty: 60 TABLET | Refills: 4 | Status: SHIPPED | OUTPATIENT
Start: 2024-03-12 | End: 2024-08-09

## 2024-03-12 RX ORDER — ROSUVASTATIN CALCIUM 20 MG/1
20 TABLET, COATED ORAL DAILY
Qty: 100 TABLET | Refills: 3 | Status: SHIPPED | OUTPATIENT
Start: 2024-03-12 | End: 2024-03-29 | Stop reason: DRUGHIGH

## 2024-03-12 RX ORDER — VENLAFAXINE HYDROCHLORIDE 150 MG/1
150 CAPSULE, EXTENDED RELEASE ORAL DAILY
Qty: 30 CAPSULE | Refills: 3 | Status: SHIPPED | OUTPATIENT
Start: 2024-03-12 | End: 2024-07-10

## 2024-03-12 RX ORDER — VARENICLINE TARTRATE 0.5 MG/1
TABLET, FILM COATED ORAL
Qty: 49 TABLET | Refills: 0 | Status: SHIPPED | OUTPATIENT
Start: 2024-03-12

## 2024-03-12 RX ORDER — VENLAFAXINE HYDROCHLORIDE 75 MG/1
75 CAPSULE, EXTENDED RELEASE ORAL DAILY
Qty: 30 CAPSULE | Refills: 3 | Status: SHIPPED | OUTPATIENT
Start: 2024-03-12 | End: 2024-07-10

## 2024-03-12 RX ORDER — ORPHENADRINE CITRATE 100 MG/1
100 TABLET, EXTENDED RELEASE ORAL EVERY 12 HOURS PRN
COMMUNITY
Start: 2024-03-11 | End: 2024-04-02

## 2024-03-12 NOTE — PROGRESS NOTES
"Subjective   Patient ID: Moni Bui is a 38 y.o. female who presents for Hospital Follow-up.    HPI Hospital follow up. Was seen yesterday at [facility] for left neck pain and shoulder pain, EKG was normal. She is also having redness, pain and pressure in left eye. She does see eye DrIdris Today.  Patient reports she is still having pain / pulling in left side of neck. 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 She does not recall any injury.     Would also like to discuss increasing Effexor as she has been on them for a long time and does not feel like they are working as well as then once did.     Review of Systems   All other systems reviewed and are negative.      Objective   /70 (BP Location: Left arm, Patient Position: Sitting, BP Cuff Size: Large adult)   Pulse 95   Ht 1.651 m (5' 5\")   Wt 110 kg (243 lb)   SpO2 95%   BMI 40.44 kg/m²     Physical Exam  Constitutional:       Appearance: Normal appearance.   HENT:      Mouth/Throat:      Mouth: Mucous membranes are moist.   Eyes:      Conjunctiva/sclera: Conjunctivae normal.   Cardiovascular:      Rate and Rhythm: Normal rate and regular rhythm.   Pulmonary:      Effort: Pulmonary effort is normal.      Breath sounds: Normal breath sounds.   Abdominal:      Palpations: Abdomen is soft.   Musculoskeletal:         General: Normal range of motion.   Skin:     General: Skin is warm and dry.   Neurological:      Mental Status: She is alert and oriented to person, place, and time.   Psychiatric:         Mood and Affect: Mood normal.         Assessment/Plan   Diagnoses and all orders for this visit:  Hyperlipidemia LDL goal <100  -     rosuvastatin (Crestor) 20 mg tablet; Take 1 tablet (20 mg) by mouth once daily.  Tobacco abuse  -     varenicline (Chantix) 0.5 mg tablet; 1 tab po every day x 7 days, then 1 tab po BID x 21 days. Take with full glass of water.  -     varenicline (Chantix) 1 mg " "tablet; Take 1 tablet (1 mg) by mouth 2 times a day. Take with full glass of water.  Anxiety and depression  -     venlafaxine XR (Effexor-XR) 150 mg 24 hr capsule; Take 1 capsule (150 mg) by mouth once daily. Do not crush or chew.  -     venlafaxine XR (Effexor-XR) 75 mg 24 hr capsule; Take 1 capsule (75 mg) by mouth once daily. Take with food.  Morbid obesity with BMI of 40.0-44.9, adult (CMS/[facility])        -  Discussed IF, decreased processed carbs, insulin resistance.        - Recommended \"Obesity Code\" as good resource for more information.  Strain of left shoulder, subsequent encounter        - Pt concerned for possible heart Dz due to the left sided pain and father with MI at age 55 yrs.         - Recommended she schedule for well exam in 4 months. All medications were given for 4 months to cover until this visit.        - Will put in screening labs for her to complete fasting prior to the visit: CBC, Lpid panel and CMP       "

## 2024-03-14 ENCOUNTER — LAB (OUTPATIENT)
Dept: LAB | Facility: LAB | Age: 39
End: 2024-03-14
Payer: COMMERCIAL

## 2024-03-14 DIAGNOSIS — E78.5 HYPERLIPIDEMIA LDL GOAL <100: ICD-10-CM

## 2024-03-14 DIAGNOSIS — Z00.00 HEALTHCARE MAINTENANCE: ICD-10-CM

## 2024-03-14 DIAGNOSIS — H15.102 UNSPECIFIED EPISCLERITIS, LEFT EYE: Primary | ICD-10-CM

## 2024-03-14 LAB
ALBUMIN SERPL BCP-MCNC: 4.6 G/DL (ref 3.4–5)
ALP SERPL-CCNC: 101 U/L (ref 33–110)
ALT SERPL W P-5'-P-CCNC: 29 U/L (ref 7–45)
ANION GAP SERPL CALC-SCNC: 13 MMOL/L (ref 10–20)
AST SERPL W P-5'-P-CCNC: 18 U/L (ref 9–39)
BASOPHILS # BLD AUTO: 0.06 X10*3/UL (ref 0–0.1)
BASOPHILS NFR BLD AUTO: 0.7 %
BILIRUB SERPL-MCNC: 0.3 MG/DL (ref 0–1.2)
BUN SERPL-MCNC: 13 MG/DL (ref 6–23)
CALCIUM SERPL-MCNC: 9.2 MG/DL (ref 8.6–10.3)
CHLORIDE SERPL-SCNC: 103 MMOL/L (ref 98–107)
CHOLEST SERPL-MCNC: 209 MG/DL (ref 0–199)
CHOLESTEROL/HDL RATIO: 4.1
CO2 SERPL-SCNC: 20 MMOL/L (ref 21–32)
CREAT SERPL-MCNC: 0.66 MG/DL (ref 0.5–1.05)
CRP SERPL-MCNC: 0.17 MG/DL
EGFRCR SERPLBLD CKD-EPI 2021: >90 ML/MIN/1.73M*2
EOSINOPHIL # BLD AUTO: 0.42 X10*3/UL (ref 0–0.7)
EOSINOPHIL NFR BLD AUTO: 4.6 %
ERYTHROCYTE [DISTWIDTH] IN BLOOD BY AUTOMATED COUNT: 13 % (ref 11.5–14.5)
ERYTHROCYTE [SEDIMENTATION RATE] IN BLOOD BY WESTERGREN METHOD: 17 MM/H (ref 0–20)
GLUCOSE SERPL-MCNC: 99 MG/DL (ref 74–99)
HCT VFR BLD AUTO: 47.2 % (ref 36–46)
HDLC SERPL-MCNC: 50.5 MG/DL
HGB BLD-MCNC: 15.3 G/DL (ref 12–16)
IMM GRANULOCYTES # BLD AUTO: 0.02 X10*3/UL (ref 0–0.7)
IMM GRANULOCYTES NFR BLD AUTO: 0.2 % (ref 0–0.9)
LDLC SERPL CALC-MCNC: 119 MG/DL
LYMPHOCYTES # BLD AUTO: 2.31 X10*3/UL (ref 1.2–4.8)
LYMPHOCYTES NFR BLD AUTO: 25 %
MCH RBC QN AUTO: 29.2 PG (ref 26–34)
MCHC RBC AUTO-ENTMCNC: 32.4 G/DL (ref 32–36)
MCV RBC AUTO: 90 FL (ref 80–100)
MONOCYTES # BLD AUTO: 0.6 X10*3/UL (ref 0.1–1)
MONOCYTES NFR BLD AUTO: 6.5 %
NEUTROPHILS # BLD AUTO: 5.82 X10*3/UL (ref 1.2–7.7)
NEUTROPHILS NFR BLD AUTO: 63 %
NON HDL CHOLESTEROL: 159 MG/DL (ref 0–149)
NRBC BLD-RTO: 0 /100 WBCS (ref 0–0)
PLATELET # BLD AUTO: 423 X10*3/UL (ref 150–450)
POTASSIUM SERPL-SCNC: 4.1 MMOL/L (ref 3.5–5.3)
PROT SERPL-MCNC: 7.4 G/DL (ref 6.4–8.2)
RBC # BLD AUTO: 5.24 X10*6/UL (ref 4–5.2)
RHEUMATOID FACT SER NEPH-ACNC: <10 IU/ML (ref 0–15)
SODIUM SERPL-SCNC: 132 MMOL/L (ref 136–145)
T3 SERPL-MCNC: 103 NG/DL (ref 60–200)
T4 FREE SERPL-MCNC: 0.45 NG/DL (ref 0.61–1.12)
TREPONEMA PALLIDUM IGG+IGM AB [PRESENCE] IN SERUM OR PLASMA BY IMMUNOASSAY: NONREACTIVE
TRIGL SERPL-MCNC: 199 MG/DL (ref 0–149)
TSH SERPL-ACNC: 3.81 MIU/L (ref 0.44–3.98)
VLDL: 40 MG/DL (ref 0–40)
WBC # BLD AUTO: 9.2 X10*3/UL (ref 4.4–11.3)

## 2024-03-14 PROCEDURE — 86235 NUCLEAR ANTIGEN ANTIBODY: CPT

## 2024-03-14 PROCEDURE — 85652 RBC SED RATE AUTOMATED: CPT

## 2024-03-14 PROCEDURE — 86225 DNA ANTIBODY NATIVE: CPT

## 2024-03-14 PROCEDURE — 85025 COMPLETE CBC W/AUTO DIFF WBC: CPT

## 2024-03-14 PROCEDURE — 86780 TREPONEMA PALLIDUM: CPT

## 2024-03-14 PROCEDURE — 86431 RHEUMATOID FACTOR QUANT: CPT

## 2024-03-14 PROCEDURE — 80053 COMPREHEN METABOLIC PANEL: CPT

## 2024-03-14 PROCEDURE — 80061 LIPID PANEL: CPT

## 2024-03-14 PROCEDURE — 86618 LYME DISEASE ANTIBODY: CPT

## 2024-03-14 PROCEDURE — 86038 ANTINUCLEAR ANTIBODIES: CPT

## 2024-03-14 PROCEDURE — 82164 ANGIOTENSIN I ENZYME TEST: CPT

## 2024-03-14 PROCEDURE — 84480 ASSAY TRIIODOTHYRONINE (T3): CPT

## 2024-03-14 PROCEDURE — 84439 ASSAY OF FREE THYROXINE: CPT

## 2024-03-14 PROCEDURE — 36415 COLL VENOUS BLD VENIPUNCTURE: CPT

## 2024-03-14 PROCEDURE — 86481 TB AG RESPONSE T-CELL SUSP: CPT

## 2024-03-14 PROCEDURE — 86140 C-REACTIVE PROTEIN: CPT

## 2024-03-14 PROCEDURE — 84443 ASSAY THYROID STIM HORMONE: CPT

## 2024-03-14 NOTE — LETTER
March 25, 2024     Moni Bui  [address] [place] [address]      Dear Ms. Bui:    Below are the results from your recent visit:    Resulted Orders   Lipid panel   Result Value Ref Range    Cholesterol 209 (H) 0 - 199 mg/dL      Comment:            Age      Desirable   Borderline High   High     0-19 Y     0 - 169       170 - 199     >/= 200    20-24 Y     0 - 189       190 - 224     >/= 225         >24 Y     0 - 199       200 - 239     >/= 240   **All ranges are based on fasting samples. Specific   therapeutic targets will vary based on patient-specific   cardiac risk.    Pediatric guidelines reference:Pediatrics 2011, 128(S5).Adult guidelines reference: NCEP ATPIII Guidelines,ALEAH 2001, 258:2486-97    Venipuncture immediately after or during the administration of Metamizole may lead to falsely low results. Testing should be performed immediately prior to Metamizole dosing.    HDL-Cholesterol 50.5 mg/dL      Comment:        Age       Very Low   Low     Normal    High    0-19 Y    < 35      < 40     40-45     ----  20-24 Y    ----     < 40      >45      ----        >24 Y      ----     < 40     40-60      >60      Cholesterol/HDL Ratio 4.1       Comment:        Ref Values  Desirable  < 3.4  High Risk  > 5.0    LDL Calculated 119 (H) <=99 mg/dL      Comment:                                  Near   Borderline      AGE      Desirable  Optimal    High     High     Very High     0-19 Y     0 - 109     ---    110-129   >/= 130     ----    20-24 Y     0 - 119     ---    120-159   >/= 160     ----      >24 Y     0 -  99   100-129  130-159   160-189     >/=190      VLDL 40 0 - 40 mg/dL    Triglycerides 199 (H) 0 - 149 mg/dL      Comment:         Age         Desirable   Borderline High   High     Very High   0 D-90 D    19 - 174         ----         ----        ----  91 D- 9 Y     0 -  74        75 -  99     >/= 100      ----    10-19 Y     0 -  89        90 - 129     >/= 130      ----    20-24 Y     0 - 114        115 - 149     >/= 150      ----         >24 Y     0 - 149       150 - 199    200- 499    >/= 500    Venipuncture immediately after or during the administration of Metamizole may lead to falsely low results. Testing should be performed immediately prior to Metamizole dosing.    Non HDL Cholesterol 159 (H) 0 - 149 mg/dL      Comment:            Age       Desirable   Borderline High   High     Very High     0-19 Y     0 - 119       120 - 144     >/= 145    >/= 160    20-24 Y     0 - 149       150 - 189     >/= 190      ----         >24 Y    30 mg/dL above LDL Cholesterol goal     Comprehensive metabolic panel   Result Value Ref Range    Glucose 99 74 - 99 mg/dL    Sodium 132 (L) 136 - 145 mmol/L    Potassium 4.1 3.5 - 5.3 mmol/L    Chloride 103 98 - 107 mmol/L    Bicarbonate 20 (L) 21 - 32 mmol/L    Anion Gap 13 10 - 20 mmol/L    Urea Nitrogen 13 6 - 23 mg/dL    Creatinine 0.66 0.50 - 1.05 mg/dL    eGFR >90 >60 mL/min/1.73m*2      Comment:      Calculations of estimated GFR are performed using the 2021 CKD-EPI Study Refit equation without the race variable for the IDMS-Traceable creatinine methods.  https://jasn.asnjournals.org/content/early/2021/09/22/ASN.9762407709    Calcium 9.2 8.6 - 10.3 mg/dL    Albumin 4.6 3.4 - 5.0 g/dL    Alkaline Phosphatase 101 33 - 110 U/L    Total Protein 7.4 6.4 - 8.2 g/dL    AST 18 9 - 39 U/L    Bilirubin, Total 0.3 0.0 - 1.2 mg/dL    ALT 29 7 - 45 U/L      Comment:      Patients treated with Sulfasalazine may generate falsely decreased results for ALT.   Sedimentation Rate   Result Value Ref Range    Sedimentation Rate 17 0 - 20 mm/h   C-Reactive Protein   Result Value Ref Range    C-Reactive Protein 0.17 <1.00 mg/dL   Rheumatoid Factor   Result Value Ref Range    Rheumatoid Factor <10 0 - 15 IU/mL   Syphilis Screen with Reflex   Result Value Ref Range    Syphilis Total Ab Nonreactive Nonreactive      Comment:      No significant level of Treponema pallidum antibody detected.    Repeat testing in 2 to 4 weeks may be considered if early   infection or incubating syphilis infection is suspected.   CBC and Auto Differential   Result Value Ref Range    WBC 9.2 4.4 - 11.3 x10*3/uL    nRBC 0.0 0.0 - 0.0 /100 WBCs    RBC 5.24 (H) 4.00 - 5.20 x10*6/uL    Hemoglobin 15.3 12.0 - 16.0 g/dL    Hematocrit 47.2 (H) 36.0 - 46.0 %    MCV 90 80 - 100 fL    MCH 29.2 26.0 - 34.0 pg    MCHC 32.4 32.0 - 36.0 g/dL    RDW 13.0 11.5 - 14.5 %    Platelets 423 150 - 450 x10*3/uL    Neutrophils % 63.0 40.0 - 80.0 %    Immature Granulocytes %, Automated 0.2 0.0 - 0.9 %      Comment:      Immature Granulocyte Count (IG) includes promyelocytes, myelocytes and metamyelocytes but does not include bands. Percent differential counts (%) should be interpreted in the context of the absolute cell counts (cells/UL).    Lymphocytes % 25.0 13.0 - 44.0 %    Monocytes % 6.5 2.0 - 10.0 %    Eosinophils % 4.6 0.0 - 6.0 %    Basophils % 0.7 0.0 - 2.0 %    Neutrophils Absolute 5.82 1.20 - 7.70 x10*3/uL      Comment:      Percent differential counts (%) should be interpreted in the context of the absolute cell counts (cells/uL).    Immature Granulocytes Absolute, Automated 0.02 0.00 - 0.70 x10*3/uL    Lymphocytes Absolute 2.31 1.20 - 4.80 x10*3/uL    Monocytes Absolute 0.60 0.10 - 1.00 x10*3/uL    Eosinophils Absolute 0.42 0.00 - 0.70 x10*3/uL    Basophils Absolute 0.06 0.00 - 0.10 x10*3/uL   Thyroid Stimulating Hormone   Result Value Ref Range    Thyroid Stimulating Hormone 3.81 0.44 - 3.98 mIU/L    Narrative    TSH testing is performed using different testing methodology at [facility] than at other [facility]. Direct result comparisons should only be made within the same method.     Triiodothyronine, Total   Result Value Ref Range    Triiodothyronine 103 60 - 200 ng/dL   Thyroxine, Free   Result Value Ref Range    Thyroxine, Free 0.45 (L) 0.61 - 1.12 ng/dL    Narrative    Thyroxine Free testing is performed using  different testing methodology at [facility] than at other [facility]. Direct result comparisons should only be made within the same method.    Biotin can cause falsely elevated free T4 results. Patients taking a Biotin dose of up to 10 mg/day should refrain from taking Biotin for 24 hours before sample collection. Patient taking a Biotin dose of >10 mg/day should consult with their physician or the laboratory before the blood draw.     Our office has made several attempts to contact  you regarding your lab results. Your cholesterol was a little high. Because of your age and being low-risk, we would usually just recommend decreasing fast/fried/fatty/processed foods and foods high in cholesterol and increase cardio exercise to around 30-45 minutes 3+ times per week. If you feel that you do not have room to alter your diet/exercise, can also initiate low-dose statin medication to correct this. Either way, we would want to recheck in 90 days. Please let me know how you would like to proceed.          Sincerely,        America Valencia PA-C

## 2024-03-15 LAB
ANA PATTERN: ABNORMAL
ANA SER QL HEP2 SUBST: POSITIVE
ANA TITR SER IF: ABNORMAL {TITER}
CENTROMERE B AB SER-ACNC: <0.2 AI
CHROMATIN AB SERPL-ACNC: <0.2 AI
DSDNA AB SER-ACNC: 15 IU/ML
ENA JO1 AB SER QL IA: <0.2 AI
ENA RNP AB SER IA-ACNC: <0.2 AI
ENA SCL70 AB SER QL IA: <0.2 AI
ENA SM AB SER IA-ACNC: <0.2 AI
ENA SM+RNP AB SER QL IA: <0.2 AI
ENA SS-A AB SER IA-ACNC: <0.2 AI
ENA SS-B AB SER IA-ACNC: <0.2 AI
RIBOSOMAL P AB SER-ACNC: <0.2 AI

## 2024-03-16 LAB
ACE SERPL-CCNC: 53 U/L (ref 16–85)
B BURGDOR.VLSE1+PEPC10 AB SER IA-ACNC: 0.19 IV
NIL(NEG) CONTROL SPOT COUNT: NORMAL
PANEL A SPOT COUNT: 0
PANEL B SPOT COUNT: 0
POS CONTROL SPOT COUNT: NORMAL
T-SPOT. TB INTERPRETATION: NEGATIVE

## 2024-03-29 ENCOUNTER — PATIENT MESSAGE (OUTPATIENT)
Dept: PRIMARY CARE | Facility: CLINIC | Age: 39
End: 2024-03-29
Payer: COMMERCIAL

## 2024-03-29 DIAGNOSIS — E78.2 MIXED HYPERLIPIDEMIA: Primary | ICD-10-CM

## 2024-03-29 RX ORDER — ROSUVASTATIN CALCIUM 40 MG/1
40 TABLET, COATED ORAL DAILY
Qty: 90 TABLET | Refills: 0 | Status: SHIPPED | OUTPATIENT
Start: 2024-03-29 | End: 2024-06-27

## 2024-04-01 ENCOUNTER — APPOINTMENT (OUTPATIENT)
Dept: PRIMARY CARE | Facility: CLINIC | Age: 39
End: 2024-04-01
Payer: COMMERCIAL

## 2024-04-02 ENCOUNTER — OFFICE VISIT (OUTPATIENT)
Dept: PRIMARY CARE | Facility: CLINIC | Age: 39
End: 2024-04-02
Payer: COMMERCIAL

## 2024-04-02 VITALS
TEMPERATURE: 98.6 F | SYSTOLIC BLOOD PRESSURE: 116 MMHG | OXYGEN SATURATION: 97 % | BODY MASS INDEX: 40.65 KG/M2 | DIASTOLIC BLOOD PRESSURE: 64 MMHG | WEIGHT: 244 LBS | HEART RATE: 92 BPM | HEIGHT: 65 IN

## 2024-04-02 DIAGNOSIS — E78.5 HYPERLIPIDEMIA LDL GOAL <100: ICD-10-CM

## 2024-04-02 DIAGNOSIS — H20.9 IRITIS OF LEFT EYE: ICD-10-CM

## 2024-04-02 DIAGNOSIS — R76.8 POSITIVE DOUBLE STRANDED DNA ANTIBODY TEST: ICD-10-CM

## 2024-04-02 DIAGNOSIS — R76.8 POSITIVE ANA (ANTINUCLEAR ANTIBODY): Primary | ICD-10-CM

## 2024-04-02 PROCEDURE — 3008F BODY MASS INDEX DOCD: CPT | Performed by: FAMILY MEDICINE

## 2024-04-02 PROCEDURE — 99214 OFFICE O/P EST MOD 30 MIN: CPT | Performed by: FAMILY MEDICINE

## 2024-04-02 RX ORDER — PREDNISOLONE ACETATE 10 MG/ML
SUSPENSION/ DROPS OPHTHALMIC
COMMUNITY
Start: 2024-03-12

## 2024-04-02 RX ORDER — ASPIRIN 81 MG/1
81 TABLET ORAL DAILY
COMMUNITY

## 2024-04-02 NOTE — PROGRESS NOTES
"Subjective   Patient ID: Moni Bui is a 38 y.o. female who presents for Results.    HPI Patient was diagnosed with Iritis by Dr. Washington Ophthalmologist last week, labs were drawn and was advised to follow up with her PCP regarding the results.     Review of Systems   All other systems reviewed and are negative.      Objective   /64   Pulse 92   Temp 37 °C (98.6 °F)   Ht 1.651 m (5' 5\")   Wt 111 kg (244 lb)   SpO2 97%   BMI 40.60 kg/m²     Physical Exam  Constitutional:       Appearance: Normal appearance.   HENT:      Mouth/Throat:      Mouth: Mucous membranes are moist.   Eyes:      Conjunctiva/sclera: Conjunctivae normal.   Cardiovascular:      Rate and Rhythm: Normal rate and regular rhythm.   Pulmonary:      Effort: Pulmonary effort is normal.      Breath sounds: Normal breath sounds.   Abdominal:      Palpations: Abdomen is soft.   Musculoskeletal:         General: Normal range of motion.   Skin:     General: Skin is warm and dry.   Neurological:      Mental Status: She is alert and oriented to person, place, and time.   Psychiatric:         Mood and Affect: Mood normal.         Assessment/Plan   Diagnoses and all orders for this visit:  Positive SOLEDAD (antinuclear antibody)        - Sending to Rheumatology ASAP  Iritis of left eye        - Concern this may be related to SLE. Rheumatology to assess and Tx as appropriate.   Positive double stranded DNA antibody test  Hyperlipidemia LDL goal <100        - Continue with Crestor 20 mg dose and recheck in 6 months       "

## 2024-05-14 ENCOUNTER — OFFICE VISIT (OUTPATIENT)
Dept: RHEUMATOLOGY | Facility: CLINIC | Age: 39
End: 2024-05-14
Payer: COMMERCIAL

## 2024-05-14 VITALS
BODY MASS INDEX: 40.9 KG/M2 | HEIGHT: 65 IN | HEART RATE: 94 BPM | WEIGHT: 245.5 LBS | SYSTOLIC BLOOD PRESSURE: 117 MMHG | DIASTOLIC BLOOD PRESSURE: 82 MMHG

## 2024-05-14 DIAGNOSIS — H20.9 IRITIS OF LEFT EYE: ICD-10-CM

## 2024-05-14 DIAGNOSIS — R76.8 POSITIVE DOUBLE STRANDED DNA ANTIBODY TEST: ICD-10-CM

## 2024-05-14 DIAGNOSIS — R76.8 POSITIVE ANA (ANTINUCLEAR ANTIBODY): ICD-10-CM

## 2024-05-14 PROCEDURE — 3008F BODY MASS INDEX DOCD: CPT | Performed by: INTERNAL MEDICINE

## 2024-05-14 PROCEDURE — 99204 OFFICE O/P NEW MOD 45 MIN: CPT | Performed by: INTERNAL MEDICINE

## 2024-05-14 RX ORDER — CELECOXIB 200 MG/1
200 CAPSULE ORAL DAILY
Qty: 30 CAPSULE | Refills: 1 | Status: SHIPPED | OUTPATIENT
Start: 2024-05-14 | End: 2024-11-10

## 2024-05-14 NOTE — PROGRESS NOTES
Initial Rheumatology Patient Visit    Chief Complaint:  Moni Bui is a 38 y.o. female presenting today for New Patient Visit (H20.9 (ICD-10-CM) - Iritis of left eyeR76.8 (ICD-10-CM) - Positive SOLEDAD (antinuclear antibody)R76.8 (ICD-10-CM) - Positive double stranded DNA antibody test/).    History of Presenting Problem:   37 yo female with Hx of Depression, Hyperlipidemia and Iritis present for abnormal SOLEDAD/DSDNA testing. She was treated for iritis of the left year  2 yrs ago at [facility] with Steroid eye drop and the symptoms ended up resolving on it however, when she is off the drop the symptoms do come back.. She was recent in the ER in  2024 for worsening of her eye symptoms and also left shoulder pain.   she report issues with  left shoulder joint pain  x 6 months occasional trouble rasing her shoulder She has taked ibuprofen 600-800 mg or Naprosen 440 mg once a day as need which typically helps 50% unless she take tylenol with it and seem to help 80% ,  she report knee pain x 3 months.   She recently seen work up which showed positive SOLEDAD and DSDNA.      Problem List:   Patient Active Problem List   Diagnosis    Hyperlipidemia    Nicotine dependence    Palpitation    Anxiety and depression    Vitamin D insufficiency    Elevated cortisol level    Migraine with aura and without status migrainosus, not intractable       Past Medical History:   Past Medical History:   Diagnosis Date    Abnormal glucose complicating pregnancy ([facility]) 2021    Glucose intolerance of pregnancy    Antepartum hemorrhage, unspecified, unspecified trimester ([facility]) 2020    Vaginal bleeding in pregnancy    Anxiety and depression 2023    Contact with and (suspected) exposure to infections with a predominantly sexual mode of transmission 2020    STD exposure    Elevated cortisol level 2023    Encounter for  screening, unspecified ([facility]) 2021     screening encounter    Encounter  for follow-up examination after completed treatment for conditions other than malignant neoplasm 2020    Postop check    Encounter for initial prescription of contraceptive pills 2020    Encounter for initial prescription of contraceptive pills    Encounter for other specified  screening ([facility]) 2020    Encounter for fetal anatomic survey    Encounter for supervision of normal first pregnancy, first trimester ([facility]) 2020    Primigravida in first trimester    Encounter for supervision of normal first pregnancy, second trimester ([facility]) 2020    Primigravida in second trimester    Encounter for supervision of normal first pregnancy, third trimester ([facility]) 2021    Primigravida in third trimester    Excessive and frequent menstruation with regular cycle 2020    Menorrhagia with regular cycle    Hyperlipidemia 2023    Migraine with aura and without status migrainosus, not intractable 2023    Person consulting for explanation of examination or test findings 2020    Encounter to discuss test results    Personal history of gestational diabetes 02/10/2021    History of gestational diabetes mellitus (GDM)    Personal history of nicotine dependence 2020    History of nicotine dependence    Personal history of other diseases of the female genital tract 2019    History of vaginal discharge    Personal history of other diseases of the female genital tract 2020    History of female infertility    Personal history of other diseases of the female genital tract 2020    Personal history of ovarian cyst    Personal history of other diseases of the female genital tract 2020    History of dysmenorrhea    Polycystic ovarian syndrome     PCOS (polycystic ovarian syndrome)    Pregnancy with inconclusive fetal viability, not applicable or unspecified ([facility]) 2020    Encounter to determine fetal viability of pregnancy     Unspecified infection of urinary tract in pregnancy, first trimester ([facility]) 08/21/2020    Urinary tract infection in mother during first trimester of pregnancy    Vitamin D insufficiency 03/08/2023    Vulvar furuncle 06/12/2023       Surgical History:   Past Surgical History:   Procedure Laterality Date    OTHER SURGICAL HISTORY  11/22/2019    Cholecystectomy    OTHER SURGICAL HISTORY  11/22/2019    Tonsillectomy with adenoidectomy        Allergies:   Allergies   Allergen Reactions    Amoxicillin-Pot Clavulanate Other     Chest Pain    Duloxetine Hives    Wellbutrin [Bupropion Hcl] Hives       Medications:   Current Outpatient Medications:     aspirin 81 mg EC tablet, Take 1 tablet (81 mg) by mouth once daily., Disp: , Rfl:     prednisoLONE acetate (Pred-Forte) 1 % ophthalmic suspension, INSTILL 1 DROP IN LEFT EYE FOUR TIMES DAILY, Disp: , Rfl:     rosuvastatin (Crestor) 40 mg tablet, Take 1 tablet (40 mg) by mouth once daily., Disp: 90 tablet, Rfl: 0    topiramate (Topamax) 25 mg tablet, Take 1 po in am and 2 po at hs., Disp: 90 tablet, Rfl: 3    varenicline (Chantix) 0.5 mg tablet, 1 tab po every day x 7 days, then 1 tab po BID x 21 days. Take with full glass of water., Disp: 49 tablet, Rfl: 0    varenicline (Chantix) 1 mg tablet, Take 1 tablet (1 mg) by mouth 2 times a day. Take with full glass of water., Disp: 60 tablet, Rfl: 4    venlafaxine XR (Effexor-XR) 150 mg 24 hr capsule, Take 1 capsule (150 mg) by mouth once daily. Do not crush or chew., Disp: 30 capsule, Rfl: 3    venlafaxine XR (Effexor-XR) 75 mg 24 hr capsule, Take 1 capsule (75 mg) by mouth once daily. Take with food., Disp: 30 capsule, Rfl: 3    celecoxib (CeleBREX) 200 mg capsule, Take 1 capsule (200 mg) by mouth once daily., Disp: 30 capsule, Rfl: 1    orphenadrine (Norflex) 100 mg 12 hr tablet, Take 1 tablet (100 mg) by mouth every 12 hours if needed., Disp: , Rfl:     Review of Systems:   ROS: Denies Morning stiffness, denies  joint  "swelling, Denies dry eyes and mouth, Hx of chronic sinusitis, Hx of ear inflammation,Denies  oral, nasal or genital ulcers, Denies sun sensitivity, Raynaud's phenomenon. Denies Uveitis Denies new rashes or Hx of Psoriasis, Denies alopecia, she report hair thinning    Denies Chest pain/SOB, cough, Denies Hx of asthma, Denies Fever/chills, but she report hot flashes since after her tubes were tied in August 2023. Denies Fatigue, Denies weight loss  Denies abdominal pain, New onset Dyspepsia, dysphasia, nausea/vomiting/diarrhea, dark/tarry stools, Denies blood in stools or urine. Denies Chronic back pain.   Denies Hx of blood clot or miscarriages. Hx of easy bruising with trauma  or bleeding. She report Hx of chronic /migraines, Denies numbness and tingling, weakness.  All other systems have been reviewed and are negative for complaint.     Objective   Physical Examination:    Visit Vitals  /82   Pulse 94   Ht 1.651 m (5' 5\")   Wt 111 kg (245 lb 8 oz)   BMI 40.85 kg/m²   OB Status Having periods   Smoking Status Every Day   BSA 2.26 m²        Gen: NAD, A&O x 3  HEENT: clear sclera and conjunctiva,     Musculoskeletal:   Neck; WNL, full ROM  Shoulder: WNL, full ROM  Elbow:WNL, full ROM, no effusion noted  Wrist and fingers;no active synovitis noted, Full ROM in the Wrist , Good fist and   Knees:  No effusions or crepitation, full ROM.  Hips; WNL, full ROM, Negative Bryce test  Ankle, Feet; WNL, full ROM    Skin: No rashes or lesions seen, no nail changes  Neuro: A&O x3, Normal Gait    Procedures :None    Orders:  Orders Placed This Encounter   Procedures    Citrulline Antibody, IgG    C3 Complement    C4 Complement    Protein, Urine Random    Creatinine, Urine Random    Anti-Cardiolipin Antibody (IgA, IgG, and IgM)    Beta-2 Glycoprotein Antibodies    Serum Protein Electrophoresis + Immunofixation    Vitamin D 25-Hydroxy,Total (for eval of Vitamin D levels)    Referral to Ophthalmology        Provider " Impression:   Assessment/Plan   Encounter Diagnoses   Name Primary?    Iritis of left eye     Positive SOLEDAD (antinuclear antibody)     Positive double stranded DNA antibody test         37 yo female with Hx of Depression, Hyperlipidemia and Iritis present for abnormal SOLEDAD/DSDNA testing.  Given persistent iritis despite any underlying cause may require chronic immunosuppression. Will clarify underlying diagnosis.  -Start Celebrex 200 mg daily for show the pain and knee pain, patient can take Tylenol as needed for pain patient to avoid other NSAIDs  -Obtain additional serologies  -Will obtain records from her ophthalmologist although would be helpful if she can see a specialist in the system so collaboration would be easier.  -Follow-up in 3 to 4 weeks

## 2024-06-10 ENCOUNTER — APPOINTMENT (OUTPATIENT)
Dept: PRIMARY CARE | Facility: CLINIC | Age: 39
End: 2024-06-10
Payer: COMMERCIAL

## 2024-07-01 ENCOUNTER — APPOINTMENT (OUTPATIENT)
Dept: RHEUMATOLOGY | Facility: CLINIC | Age: 39
End: 2024-07-01
Payer: COMMERCIAL

## 2024-07-08 DIAGNOSIS — F41.9 ANXIETY AND DEPRESSION: ICD-10-CM

## 2024-07-08 DIAGNOSIS — F32.A ANXIETY AND DEPRESSION: ICD-10-CM

## 2024-07-08 NOTE — TELEPHONE ENCOUNTER
Patient requesting refills of medication. Please review and approve. Appointment is needed for additional refills.

## 2024-07-09 RX ORDER — VENLAFAXINE HYDROCHLORIDE 75 MG/1
75 CAPSULE, EXTENDED RELEASE ORAL DAILY
Qty: 30 CAPSULE | Refills: 0 | Status: SHIPPED | OUTPATIENT
Start: 2024-07-09 | End: 2024-08-08

## 2024-07-09 RX ORDER — VENLAFAXINE HYDROCHLORIDE 150 MG/1
150 CAPSULE, EXTENDED RELEASE ORAL DAILY
Qty: 30 CAPSULE | Refills: 0 | Status: SHIPPED | OUTPATIENT
Start: 2024-07-09 | End: 2024-08-08

## 2024-08-08 DIAGNOSIS — F41.9 ANXIETY AND DEPRESSION: ICD-10-CM

## 2024-08-08 DIAGNOSIS — E78.2 MIXED HYPERLIPIDEMIA: ICD-10-CM

## 2024-08-08 DIAGNOSIS — F32.A ANXIETY AND DEPRESSION: ICD-10-CM

## 2024-08-08 DIAGNOSIS — Z00.00 ROUTINE GENERAL MEDICAL EXAMINATION AT A HEALTH CARE FACILITY: ICD-10-CM

## 2024-08-08 RX ORDER — VENLAFAXINE HYDROCHLORIDE 150 MG/1
150 CAPSULE, EXTENDED RELEASE ORAL DAILY
Qty: 30 CAPSULE | Refills: 0 | Status: CANCELLED | OUTPATIENT
Start: 2024-08-08 | End: 2024-09-07

## 2024-08-08 RX ORDER — VENLAFAXINE HYDROCHLORIDE 75 MG/1
75 CAPSULE, EXTENDED RELEASE ORAL DAILY
Qty: 30 CAPSULE | Refills: 0 | Status: CANCELLED | OUTPATIENT
Start: 2024-08-08 | End: 2024-09-07

## 2024-08-08 NOTE — PROGRESS NOTES
Appointment scheduled for next week does not have enough medication to last.  Also asking for fasting lab orders.   15-Dec-2021 21:30

## 2024-08-10 DIAGNOSIS — F41.9 ANXIETY AND DEPRESSION: ICD-10-CM

## 2024-08-10 DIAGNOSIS — F32.A ANXIETY AND DEPRESSION: ICD-10-CM

## 2024-08-10 RX ORDER — VENLAFAXINE HYDROCHLORIDE 150 MG/1
150 CAPSULE, EXTENDED RELEASE ORAL DAILY
Qty: 30 CAPSULE | Refills: 0 | Status: SHIPPED | OUTPATIENT
Start: 2024-08-10 | End: 2024-09-09

## 2024-08-10 RX ORDER — VENLAFAXINE HYDROCHLORIDE 75 MG/1
CAPSULE, EXTENDED RELEASE ORAL
Qty: 30 CAPSULE | Refills: 0 | Status: SHIPPED | OUTPATIENT
Start: 2024-08-10

## 2024-08-10 NOTE — TELEPHONE ENCOUNTER
Pt called Dr. Greenberg on call - per TJS, sent 30 day supply of medication to pharmacy. Has upcoming appt 8/15/24.

## 2024-08-12 ENCOUNTER — LAB (OUTPATIENT)
Dept: LAB | Facility: LAB | Age: 39
End: 2024-08-12
Payer: COMMERCIAL

## 2024-08-12 DIAGNOSIS — E78.2 MIXED HYPERLIPIDEMIA: ICD-10-CM

## 2024-08-12 DIAGNOSIS — Z00.00 ROUTINE GENERAL MEDICAL EXAMINATION AT A HEALTH CARE FACILITY: ICD-10-CM

## 2024-08-12 DIAGNOSIS — R76.8 POSITIVE ANA (ANTINUCLEAR ANTIBODY): ICD-10-CM

## 2024-08-12 LAB
25(OH)D3 SERPL-MCNC: 33 NG/ML (ref 30–100)
ALBUMIN SERPL BCP-MCNC: 4.2 G/DL (ref 3.4–5)
ALP SERPL-CCNC: 113 U/L (ref 33–110)
ALT SERPL W P-5'-P-CCNC: 31 U/L (ref 7–45)
ANION GAP SERPL CALC-SCNC: 12 MMOL/L (ref 10–20)
AST SERPL W P-5'-P-CCNC: 21 U/L (ref 9–39)
B2 GLYCOPROT1 IGA SER-ACNC: <0.6 U/ML
B2 GLYCOPROT1 IGG SER-ACNC: <1.4 U/ML
B2 GLYCOPROT1 IGM SER-ACNC: 0.6 U/ML
BILIRUB SERPL-MCNC: 0.3 MG/DL (ref 0–1.2)
BUN SERPL-MCNC: 16 MG/DL (ref 6–23)
CALCIUM SERPL-MCNC: 8.9 MG/DL (ref 8.6–10.3)
CARDIOLIPIN IGA SERPL-ACNC: <0.5 APL U/ML
CARDIOLIPIN IGG SER IA-ACNC: <1.6 GPL U/ML
CARDIOLIPIN IGM SER IA-ACNC: <0.2 MPL U/ML
CCP IGG SERPL-ACNC: <1 U/ML
CHLORIDE SERPL-SCNC: 106 MMOL/L (ref 98–107)
CHOLEST SERPL-MCNC: 158 MG/DL (ref 0–199)
CHOLESTEROL/HDL RATIO: 3.7
CO2 SERPL-SCNC: 25 MMOL/L (ref 21–32)
CREAT SERPL-MCNC: 0.69 MG/DL (ref 0.5–1.05)
CREAT UR-MCNC: 106.1 MG/DL (ref 20–320)
EGFRCR SERPLBLD CKD-EPI 2021: >90 ML/MIN/1.73M*2
ERYTHROCYTE [DISTWIDTH] IN BLOOD BY AUTOMATED COUNT: 13.2 % (ref 11.5–14.5)
GLUCOSE SERPL-MCNC: 101 MG/DL (ref 74–99)
HCT VFR BLD AUTO: 42.8 % (ref 36–46)
HDLC SERPL-MCNC: 42.7 MG/DL
HGB BLD-MCNC: 13.8 G/DL (ref 12–16)
LDLC SERPL CALC-MCNC: 84 MG/DL
MCH RBC QN AUTO: 29.6 PG (ref 26–34)
MCHC RBC AUTO-ENTMCNC: 32.2 G/DL (ref 32–36)
MCV RBC AUTO: 92 FL (ref 80–100)
NON HDL CHOLESTEROL: 115 MG/DL (ref 0–149)
NRBC BLD-RTO: 0 /100 WBCS (ref 0–0)
PLATELET # BLD AUTO: 375 X10*3/UL (ref 150–450)
POTASSIUM SERPL-SCNC: 5 MMOL/L (ref 3.5–5.3)
PROT SERPL-MCNC: 6.7 G/DL (ref 6.4–8.2)
PROT UR-ACNC: 8 MG/DL (ref 5–24)
PROT/CREAT UR: 0.08 MG/MG CREAT (ref 0–0.17)
RBC # BLD AUTO: 4.67 X10*6/UL (ref 4–5.2)
SODIUM SERPL-SCNC: 138 MMOL/L (ref 136–145)
TRIGL SERPL-MCNC: 157 MG/DL (ref 0–149)
VLDL: 31 MG/DL (ref 0–40)
WBC # BLD AUTO: 7.9 X10*3/UL (ref 4.4–11.3)

## 2024-08-12 PROCEDURE — 86160 COMPLEMENT ANTIGEN: CPT

## 2024-08-12 PROCEDURE — 80061 LIPID PANEL: CPT

## 2024-08-12 PROCEDURE — 86146 BETA-2 GLYCOPROTEIN ANTIBODY: CPT

## 2024-08-12 PROCEDURE — 86147 CARDIOLIPIN ANTIBODY EA IG: CPT

## 2024-08-12 PROCEDURE — 85027 COMPLETE CBC AUTOMATED: CPT

## 2024-08-12 PROCEDURE — 84155 ASSAY OF PROTEIN SERUM: CPT

## 2024-08-12 PROCEDURE — 82570 ASSAY OF URINE CREATININE: CPT

## 2024-08-12 PROCEDURE — 84165 PROTEIN E-PHORESIS SERUM: CPT

## 2024-08-12 PROCEDURE — 86200 CCP ANTIBODY: CPT

## 2024-08-12 PROCEDURE — 84156 ASSAY OF PROTEIN URINE: CPT

## 2024-08-12 PROCEDURE — 36415 COLL VENOUS BLD VENIPUNCTURE: CPT

## 2024-08-12 PROCEDURE — 80053 COMPREHEN METABOLIC PANEL: CPT

## 2024-08-12 PROCEDURE — 82306 VITAMIN D 25 HYDROXY: CPT

## 2024-08-12 PROCEDURE — 86334 IMMUNOFIX E-PHORESIS SERUM: CPT

## 2024-08-14 LAB
ALBUMIN: 4 G/DL (ref 3.4–5)
ALPHA 1 GLOBULIN: 0.3 G/DL (ref 0.2–0.6)
ALPHA 2 GLOBULIN: 0.7 G/DL (ref 0.4–1.1)
BETA GLOBULIN: 0.8 G/DL (ref 0.5–1.2)
GAMMA GLOBULIN: 0.8 G/DL (ref 0.5–1.4)
IMMUNOFIXATION COMMENT: NORMAL
PATH REVIEW - SERUM IMMUNOFIXATION: NORMAL
PATH REVIEW-SERUM PROTEIN ELECTROPHORESIS: NORMAL
PROTEIN ELECTROPHORESIS COMMENT: NORMAL

## 2024-08-15 ENCOUNTER — APPOINTMENT (OUTPATIENT)
Dept: PRIMARY CARE | Facility: CLINIC | Age: 39
End: 2024-08-15
Payer: COMMERCIAL

## 2024-08-15 ENCOUNTER — TELEPHONE (OUTPATIENT)
Dept: PRIMARY CARE | Facility: CLINIC | Age: 39
End: 2024-08-15

## 2024-08-15 ENCOUNTER — LAB (OUTPATIENT)
Dept: LAB | Facility: LAB | Age: 39
End: 2024-08-15
Payer: COMMERCIAL

## 2024-08-15 VITALS
WEIGHT: 255 LBS | DIASTOLIC BLOOD PRESSURE: 80 MMHG | HEART RATE: 91 BPM | BODY MASS INDEX: 42.49 KG/M2 | SYSTOLIC BLOOD PRESSURE: 120 MMHG | OXYGEN SATURATION: 97 % | HEIGHT: 65 IN

## 2024-08-15 DIAGNOSIS — F90.2 ATTENTION DEFICIT HYPERACTIVITY DISORDER (ADHD), COMBINED TYPE: Primary | ICD-10-CM

## 2024-08-15 DIAGNOSIS — F90.2 ATTENTION DEFICIT HYPERACTIVITY DISORDER (ADHD), COMBINED TYPE: ICD-10-CM

## 2024-08-15 DIAGNOSIS — Z00.00 ROUTINE GENERAL MEDICAL EXAMINATION AT A HEALTH CARE FACILITY: ICD-10-CM

## 2024-08-15 LAB
AMPHETAMINES UR QL SCN: NORMAL
BARBITURATES UR QL SCN: NORMAL
BENZODIAZ UR QL SCN: NORMAL
BZE UR QL SCN: NORMAL
CANNABINOIDS UR QL SCN: NORMAL
FENTANYL+NORFENTANYL UR QL SCN: NORMAL
METHADONE UR QL SCN: NORMAL
OPIATES UR QL SCN: NORMAL
OXYCODONE+OXYMORPHONE UR QL SCN: NORMAL
PCP UR QL SCN: NORMAL

## 2024-08-15 PROCEDURE — 99214 OFFICE O/P EST MOD 30 MIN: CPT | Performed by: FAMILY MEDICINE

## 2024-08-15 PROCEDURE — 3008F BODY MASS INDEX DOCD: CPT | Performed by: FAMILY MEDICINE

## 2024-08-15 PROCEDURE — 99395 PREV VISIT EST AGE 18-39: CPT | Performed by: FAMILY MEDICINE

## 2024-08-15 PROCEDURE — 80307 DRUG TEST PRSMV CHEM ANLYZR: CPT

## 2024-08-15 RX ORDER — IBUPROFEN 200 MG
200 TABLET ORAL EVERY 6 HOURS
COMMUNITY

## 2024-08-15 RX ORDER — LISDEXAMFETAMINE DIMESYLATE 40 MG/1
40 CAPSULE ORAL EVERY MORNING
Qty: 15 CAPSULE | Refills: 0 | Status: SHIPPED | OUTPATIENT
Start: 2024-08-15 | End: 2024-08-15 | Stop reason: CLARIF

## 2024-08-15 RX ORDER — ACETAMINOPHEN 500 MG/1
CAPSULE, LIQUID FILLED ORAL
COMMUNITY

## 2024-08-15 RX ORDER — DEXTROAMPHETAMINE SACCHARATE, AMPHETAMINE ASPARTATE MONOHYDRATE, DEXTROAMPHETAMINE SULFATE AND AMPHETAMINE SULFATE 5; 5; 5; 5 MG/1; MG/1; MG/1; MG/1
20 CAPSULE, EXTENDED RELEASE ORAL EVERY MORNING
Qty: 15 CAPSULE | Refills: 0 | Status: SHIPPED | OUTPATIENT
Start: 2024-08-15 | End: 2024-08-30

## 2024-08-15 ASSESSMENT — PATIENT HEALTH QUESTIONNAIRE - PHQ9
1. LITTLE INTEREST OR PLEASURE IN DOING THINGS: NOT AT ALL
SUM OF ALL RESPONSES TO PHQ9 QUESTIONS 1 AND 2: 2
2. FEELING DOWN, DEPRESSED OR HOPELESS: MORE THAN HALF THE DAYS
10. IF YOU CHECKED OFF ANY PROBLEMS, HOW DIFFICULT HAVE THESE PROBLEMS MADE IT FOR YOU TO DO YOUR WORK, TAKE CARE OF THINGS AT HOME, OR GET ALONG WITH OTHER PEOPLE: VERY DIFFICULT

## 2024-08-15 ASSESSMENT — COLUMBIA-SUICIDE SEVERITY RATING SCALE - C-SSRS
1. IN THE PAST MONTH, HAVE YOU WISHED YOU WERE DEAD OR WISHED YOU COULD GO TO SLEEP AND NOT WAKE UP?: NO
6. HAVE YOU EVER DONE ANYTHING, STARTED TO DO ANYTHING, OR PREPARED TO DO ANYTHING TO END YOUR LIFE?: NO
2. HAVE YOU ACTUALLY HAD ANY THOUGHTS OF KILLING YOURSELF?: NO

## 2024-08-15 NOTE — PROGRESS NOTES
"Subjective   Patient ID: Moni Bui is a 39 y.o. female who presents for Annual Exam.    HPI Patient had fasting labs drawn prior to office visit. Last Pap test was done in 2019. Patients maternal great aunt had breast cancer. Denies ever having screening mammogram.     Also states she is taking Effexor for anxiety/depression but thinks either medication is no longer working or that she now has ADHD. States her 77 year old grandmother moved in, she has a toddler and works full time. C/o feeling depressed, stressed, overwhelmed, difficulty focusing on one task.     Review of Systems   All other systems reviewed and are negative.      Objective   /80   Pulse 91   Ht 1.651 m (5' 5\")   Wt 116 kg (255 lb)   SpO2 97%   BMI 42.43 kg/m²     Physical Exam  Constitutional:       Appearance: Normal appearance.   HENT:      Mouth/Throat:      Mouth: Mucous membranes are moist.   Eyes:      Conjunctiva/sclera: Conjunctivae normal.   Cardiovascular:      Rate and Rhythm: Normal rate and regular rhythm.   Pulmonary:      Effort: Pulmonary effort is normal.      Breath sounds: Normal breath sounds.   Abdominal:      Palpations: Abdomen is soft.   Musculoskeletal:         General: Normal range of motion.   Skin:     General: Skin is warm and dry.   Neurological:      Mental Status: She is alert and oriented to person, place, and time.   Psychiatric:         Mood and Affect: Mood normal.       Assessment/Plan   Diagnoses and all orders for this visit:  Attention deficit hyperactivity disorder (ADHD), combined type  -     Drug Screen, Urine With Reflex to Confirmation; Future  -     amphetamine-dextroamphetamine XR (Adderall XR) 20 mg 24 hr capsule; Take 1 capsule (20 mg) by mouth once daily in the morning for 15 days. Do not crush or chew.  Routine general medical examination at a health care facility        - Essentially normal well exam     "

## 2024-08-16 LAB
C3 SERPL-MCNC: 159 MG/DL (ref 87–200)
C4 SERPL-MCNC: 37 MG/DL (ref 10–50)
PROT SERPL-MCNC: 6.8 G/DL (ref 6.4–8.2)

## 2024-08-30 ENCOUNTER — APPOINTMENT (OUTPATIENT)
Dept: PRIMARY CARE | Facility: CLINIC | Age: 39
End: 2024-08-30
Payer: COMMERCIAL

## 2024-08-30 VITALS
BODY MASS INDEX: 41.65 KG/M2 | WEIGHT: 250 LBS | HEIGHT: 65 IN | OXYGEN SATURATION: 97 % | HEART RATE: 102 BPM | SYSTOLIC BLOOD PRESSURE: 110 MMHG | DIASTOLIC BLOOD PRESSURE: 76 MMHG

## 2024-08-30 DIAGNOSIS — G43.109 MIGRAINE WITH AURA AND WITHOUT STATUS MIGRAINOSUS, NOT INTRACTABLE: ICD-10-CM

## 2024-08-30 DIAGNOSIS — F90.2 ATTENTION DEFICIT HYPERACTIVITY DISORDER (ADHD), COMBINED TYPE: Primary | ICD-10-CM

## 2024-08-30 PROCEDURE — 3008F BODY MASS INDEX DOCD: CPT | Performed by: FAMILY MEDICINE

## 2024-08-30 PROCEDURE — 99214 OFFICE O/P EST MOD 30 MIN: CPT | Performed by: FAMILY MEDICINE

## 2024-08-30 PROCEDURE — 80324 DRUG SCREEN AMPHETAMINES 1/2: CPT

## 2024-08-30 PROCEDURE — 4004F PT TOBACCO SCREEN RCVD TLK: CPT | Performed by: FAMILY MEDICINE

## 2024-08-30 RX ORDER — TOPIRAMATE 100 MG/1
100 TABLET, FILM COATED ORAL 2 TIMES DAILY
Qty: 60 TABLET | Refills: 0 | Status: SHIPPED | OUTPATIENT
Start: 2024-08-30 | End: 2024-09-29

## 2024-08-30 RX ORDER — TOPIRAMATE 50 MG/1
50 TABLET, FILM COATED ORAL 2 TIMES DAILY
Qty: 60 TABLET | Refills: 0 | Status: SHIPPED | OUTPATIENT
Start: 2024-08-30 | End: 2024-09-29

## 2024-08-30 ASSESSMENT — PATIENT HEALTH QUESTIONNAIRE - PHQ9
1. LITTLE INTEREST OR PLEASURE IN DOING THINGS: NOT AT ALL
2. FEELING DOWN, DEPRESSED OR HOPELESS: NOT AT ALL
SUM OF ALL RESPONSES TO PHQ9 QUESTIONS 1 AND 2: 0

## 2024-08-30 NOTE — PROGRESS NOTES
"Subjective   Patient ID: Moni Bui is a 39 y.o. female who presents for Follow-up.    HPI Patient is here to discuss ADHD medication. Is currently on Adderall XR 20 mg, she does not feel its done anything for her, would like to see what other options could be.       Review of Systems   All other systems reviewed and are negative.      Objective   /76   Pulse 102   Ht 1.651 m (5' 5\")   Wt 113 kg (250 lb)   SpO2 97%   BMI 41.60 kg/m²     Physical Exam  Constitutional:       Appearance: Normal appearance.   HENT:      Mouth/Throat:      Mouth: Mucous membranes are moist.   Eyes:      Conjunctiva/sclera: Conjunctivae normal.   Cardiovascular:      Rate and Rhythm: Normal rate and regular rhythm.   Pulmonary:      Effort: Pulmonary effort is normal.      Breath sounds: Normal breath sounds.   Abdominal:      Palpations: Abdomen is soft.   Musculoskeletal:         General: Normal range of motion.   Skin:     General: Skin is warm and dry.   Neurological:      Mental Status: She is alert and oriented to person, place, and time.   Psychiatric:         Mood and Affect: Mood normal.         Assessment/Plan   Diagnoses and all orders for this visit:  Attention deficit hyperactivity disorder (ADHD), combined type  -     Drug Screen, Urine With Reflex to Confirmation  -     Amphetamine Confirm, Urine  Migraine with aura and without status migrainosus, not intractable  -     topiramate (Topamax) 50 mg tablet; Take 1 tablet (50 mg) by mouth 2 times a day.  -     topiramate (Topamax) 100 mg tablet; Take 1 tablet (100 mg) by mouth 2 times a day.       "

## 2024-08-31 LAB
AMPHETAMINES UR QL SCN: ABNORMAL
BARBITURATES UR QL SCN: ABNORMAL
BENZODIAZ UR QL SCN: ABNORMAL
BZE UR QL SCN: ABNORMAL
CANNABINOIDS UR QL SCN: ABNORMAL
FENTANYL+NORFENTANYL UR QL SCN: ABNORMAL
METHADONE UR QL SCN: ABNORMAL
OPIATES UR QL SCN: ABNORMAL
OXYCODONE+OXYMORPHONE UR QL SCN: ABNORMAL
PCP UR QL SCN: ABNORMAL

## 2024-09-04 LAB
AMPHET UR-MCNC: 2190 NG/ML
MDA UR-MCNC: <200 NG/ML
MDEA UR-MCNC: <200 NG/ML
MDMA UR-MCNC: <200 NG/ML
METHAMPHET UR-MCNC: <200 NG/ML
PHENTERMINE UR CFM-MCNC: <200 NG/ML

## 2024-09-06 LAB
AMPHET UR-MCNC: 2108 NG/ML
MDA UR-MCNC: <200 NG/ML
MDEA UR-MCNC: <200 NG/ML
MDMA UR-MCNC: <200 NG/ML
METHAMPHET UR-MCNC: <200 NG/ML
PHENTERMINE UR CFM-MCNC: <200 NG/ML

## 2024-10-16 ENCOUNTER — APPOINTMENT (OUTPATIENT)
Dept: PRIMARY CARE | Facility: CLINIC | Age: 39
End: 2024-10-16
Payer: COMMERCIAL

## 2024-10-16 VITALS
TEMPERATURE: 98.6 F | SYSTOLIC BLOOD PRESSURE: 122 MMHG | WEIGHT: 252.8 LBS | OXYGEN SATURATION: 98 % | BODY MASS INDEX: 42.12 KG/M2 | HEIGHT: 65 IN | HEART RATE: 91 BPM | DIASTOLIC BLOOD PRESSURE: 90 MMHG

## 2024-10-16 DIAGNOSIS — E78.2 MIXED HYPERLIPIDEMIA: ICD-10-CM

## 2024-10-16 DIAGNOSIS — F32.A ANXIETY AND DEPRESSION: ICD-10-CM

## 2024-10-16 DIAGNOSIS — G43.109 MIGRAINE WITH AURA AND WITHOUT STATUS MIGRAINOSUS, NOT INTRACTABLE: ICD-10-CM

## 2024-10-16 DIAGNOSIS — F41.9 ANXIETY AND DEPRESSION: ICD-10-CM

## 2024-10-16 PROCEDURE — 4004F PT TOBACCO SCREEN RCVD TLK: CPT | Performed by: FAMILY MEDICINE

## 2024-10-16 PROCEDURE — 3008F BODY MASS INDEX DOCD: CPT | Performed by: FAMILY MEDICINE

## 2024-10-16 PROCEDURE — 99214 OFFICE O/P EST MOD 30 MIN: CPT | Performed by: FAMILY MEDICINE

## 2024-10-16 RX ORDER — VENLAFAXINE HYDROCHLORIDE 37.5 MG/1
37.5 CAPSULE, EXTENDED RELEASE ORAL DAILY
Qty: 15 CAPSULE | Refills: 0 | Status: SHIPPED | OUTPATIENT
Start: 2024-10-16 | End: 2024-10-31

## 2024-10-16 RX ORDER — VENLAFAXINE HYDROCHLORIDE 150 MG/1
150 CAPSULE, EXTENDED RELEASE ORAL DAILY
Qty: 90 CAPSULE | Refills: 0 | Status: CANCELLED | OUTPATIENT
Start: 2024-10-16 | End: 2025-01-14

## 2024-10-16 RX ORDER — ROSUVASTATIN CALCIUM 40 MG/1
40 TABLET, COATED ORAL DAILY
Qty: 90 TABLET | Refills: 1 | Status: SHIPPED | OUTPATIENT
Start: 2024-10-16 | End: 2025-04-14

## 2024-10-16 RX ORDER — TOPIRAMATE 50 MG/1
50 TABLET, FILM COATED ORAL 2 TIMES DAILY
Qty: 180 TABLET | Refills: 0 | Status: CANCELLED | OUTPATIENT
Start: 2024-10-16 | End: 2025-01-14

## 2024-10-16 RX ORDER — VENLAFAXINE HYDROCHLORIDE 75 MG/1
75 CAPSULE, EXTENDED RELEASE ORAL DAILY
Qty: 10 CAPSULE | Refills: 0 | Status: SHIPPED | OUTPATIENT
Start: 2024-10-16 | End: 2024-10-26

## 2024-10-16 RX ORDER — TOPIRAMATE 100 MG/1
100 TABLET, FILM COATED ORAL 2 TIMES DAILY
Qty: 180 TABLET | Refills: 0 | Status: SHIPPED | OUTPATIENT
Start: 2024-10-16 | End: 2025-01-14

## 2024-10-16 NOTE — PROGRESS NOTES
"Subjective   Patient ID: Moni Bui is a 39 y.o. female who presents for Med Refill.    HPI     Pt is here for refills of medications to treat the following medical conditions. Unless otherwise stated, these conditions are well-controlled, pt is taking medications s Rx, and there are no reported side effects to medications or other treatment: anxiety and depression, migraine, hyperlipidemia     Review of Systems   All other systems reviewed and are negative.      Objective   /90 (BP Location: Right arm, Patient Position: Sitting)   Pulse 91   Temp 37 °C (98.6 °F) (Oral)   Ht 1.651 m (5' 5\")   Wt 115 kg (252 lb 12.8 oz)   SpO2 98%   BMI 42.07 kg/m²     Physical Exam  Constitutional:       Appearance: Normal appearance.   HENT:      Mouth/Throat:      Mouth: Mucous membranes are moist.   Eyes:      Conjunctiva/sclera: Conjunctivae normal.   Cardiovascular:      Rate and Rhythm: Normal rate and regular rhythm.   Pulmonary:      Effort: Pulmonary effort is normal.      Breath sounds: Normal breath sounds.   Abdominal:      Palpations: Abdomen is soft.   Musculoskeletal:         General: Normal range of motion.   Skin:     General: Skin is warm and dry.   Neurological:      Mental Status: She is alert and oriented to person, place, and time.   Psychiatric:         Mood and Affect: Mood normal.         Assessment/Plan   Diagnoses and all orders for this visit:  Anxiety and depression  -     venlafaxine XR (Effexor-XR) 75 mg 24 hr capsule; Take 1 capsule (75 mg) by mouth once daily for 10 days. Take with food.  -     venlafaxine XR (Effexor-XR) 37.5 mg 24 hr capsule; Take 1 capsule (37.5 mg) by mouth once daily for 15 days. Do not crush or chew.  Migraine with aura and without status migrainosus, not intractable  -     topiramate (Topamax) 100 mg tablet; Take 1 tablet (100 mg) by mouth 2 times a day.  Mixed hyperlipidemia  -     rosuvastatin (Crestor) 40 mg tablet; Take 1 tablet (40 mg) by mouth once " daily.

## 2024-11-27 ENCOUNTER — TELEPHONE (OUTPATIENT)
Dept: PRIMARY CARE | Facility: CLINIC | Age: 39
End: 2024-11-27
Payer: COMMERCIAL

## 2024-11-27 DIAGNOSIS — F41.9 ANXIETY AND DEPRESSION: ICD-10-CM

## 2024-11-27 DIAGNOSIS — F32.A ANXIETY AND DEPRESSION: ICD-10-CM

## 2024-11-27 RX ORDER — VENLAFAXINE HYDROCHLORIDE 150 MG/1
150 CAPSULE, EXTENDED RELEASE ORAL DAILY
Qty: 30 CAPSULE | Refills: 1 | Status: SHIPPED | OUTPATIENT
Start: 2024-11-27 | End: 2025-01-26

## 2024-11-27 NOTE — TELEPHONE ENCOUNTER
Patient called stating she restarted her Effexor 150mg and is requesting a refill. She has appointment in January but will be out of medication.   Spoke with provider, Rx placed as discussed. Please review and approve.

## 2025-01-13 ENCOUNTER — APPOINTMENT (OUTPATIENT)
Dept: PRIMARY CARE | Facility: CLINIC | Age: 40
End: 2025-01-13
Payer: COMMERCIAL

## 2025-01-13 ENCOUNTER — OFFICE VISIT (OUTPATIENT)
Dept: URGENT CARE | Age: 40
End: 2025-01-13
Payer: COMMERCIAL

## 2025-01-13 VITALS
OXYGEN SATURATION: 97 % | TEMPERATURE: 97.4 F | SYSTOLIC BLOOD PRESSURE: 152 MMHG | DIASTOLIC BLOOD PRESSURE: 84 MMHG | HEART RATE: 95 BPM | RESPIRATION RATE: 16 BRPM

## 2025-01-13 DIAGNOSIS — H66.91 RIGHT OTITIS MEDIA, UNSPECIFIED OTITIS MEDIA TYPE: Primary | ICD-10-CM

## 2025-01-13 DIAGNOSIS — J18.9 WALKING PNEUMONIA: ICD-10-CM

## 2025-01-13 PROCEDURE — 99203 OFFICE O/P NEW LOW 30 MIN: CPT | Performed by: PHYSICIAN ASSISTANT

## 2025-01-13 RX ORDER — AZITHROMYCIN 250 MG/1
TABLET, FILM COATED ORAL
Qty: 6 TABLET | Refills: 0 | Status: SHIPPED | OUTPATIENT
Start: 2025-01-13 | End: 2025-01-18

## 2025-01-13 ASSESSMENT — ENCOUNTER SYMPTOMS
CHEST TIGHTNESS: 1
CHILLS: 0
WHEEZING: 1
FEVER: 1
RHINORRHEA: 0
SORE THROAT: 0
COUGH: 1
SHORTNESS OF BREATH: 1

## 2025-01-13 NOTE — LETTER
January 13, 2025     Patient: Moni Bui   YOB: 1985   Date of Visit: 1/13/2025       To Whom It May Concern:    Moni Bui was seen in my clinic on 1/13/2025 at 11:45 am. Please excuse Moni for her absence from work on this day.    If you have any questions or concerns, please don't hesitate to call.         Sincerely,         Nancy Davison PA-C        CC: No Recipients

## 2025-01-13 NOTE — PROGRESS NOTES
Subjective   Patient ID: Moni Bui is a 39 y.o. female. They present today with a chief complaint of Cough (2 weeks).    History of Present Illness  Patient presents for two week history of illness symptoms. She states that she has had and occasionally productive cough, chest tightness and wheezing. She has tried Prednisone that was prescribed to her mother as well as albuterol and duoneb treatments. She says that this does help she continues to feel symptoms. She also has some nasal congestion that Flonase helps with as well as an occasional fever and bilateral ear pain. She does have a history of childhood asthma. She does continue to smoke. She denies any current fevers or chills or sore throat.      Cough  Associated symptoms include ear pain, a fever (occasional), shortness of breath and wheezing. Pertinent negatives include no chills, rhinorrhea or sore throat.       Past Medical History  Allergies as of 2025 - Reviewed 2025   Allergen Reaction Noted    Amoxicillin-pot clavulanate Other 2023    Bupropion Hives 08/15/2024    Duloxetine Hives 08/15/2022    Wellbutrin [bupropion hcl] Hives 2023       (Not in a hospital admission)       Past Medical History:   Diagnosis Date    Abnormal glucose complicating pregnancy ([facility]) 2021    Glucose intolerance of pregnancy    Antepartum hemorrhage, unspecified, unspecified trimester ([facility]) 2020    Vaginal bleeding in pregnancy    Anxiety and depression 2023    Contact with and (suspected) exposure to infections with a predominantly sexual mode of transmission 2020    STD exposure    Elevated cortisol level 2023    Encounter for  screening, unspecified 2021     screening encounter    Encounter for follow-up examination after completed treatment for conditions other than malignant neoplasm 2020    Postop check    Encounter for initial prescription of contraceptive pills  2020    Encounter for initial prescription of contraceptive pills    Encounter for other specified  screening 2020    Encounter for fetal anatomic survey    Encounter for supervision of normal first pregnancy, first trimester 2020    Primigravida in first trimester    Encounter for supervision of normal first pregnancy, second trimester 2020    Primigravida in second trimester    Encounter for supervision of normal first pregnancy, third trimester 2021    Primigravida in third trimester    Excessive and frequent menstruation with regular cycle 2020    Menorrhagia with regular cycle    Hyperlipidemia 2023    Migraine with aura and without status migrainosus, not intractable 2023    Person consulting for explanation of examination or test findings 2020    Encounter to discuss test results    Personal history of gestational diabetes 02/10/2021    History of gestational diabetes mellitus (GDM)    Personal history of nicotine dependence 2020    History of nicotine dependence    Personal history of other diseases of the female genital tract 2019    History of vaginal discharge    Personal history of other diseases of the female genital tract 2020    History of female infertility    Personal history of other diseases of the female genital tract 2020    Personal history of ovarian cyst    Personal history of other diseases of the female genital tract 2020    History of dysmenorrhea    Polycystic ovarian syndrome     PCOS (polycystic ovarian syndrome)    Pregnancy with inconclusive fetal viability, not applicable or unspecified 2020    Encounter to determine fetal viability of pregnancy    Unspecified infection of urinary tract in pregnancy, first trimester ([facility]) 2020    Urinary tract infection in mother during first trimester of pregnancy    Vitamin D insufficiency 2023    Vulvar furuncle 2023       Past  Surgical History:   Procedure Laterality Date    OTHER SURGICAL HISTORY  11/22/2019    Cholecystectomy    OTHER SURGICAL HISTORY  11/22/2019    Tonsillectomy with adenoidectomy        reports that she has been smoking cigarettes. She started smoking about 23 years ago. She has a 11.5 pack-year smoking history. She has never used smokeless tobacco. She reports current alcohol use. She reports that she does not use drugs.    Review of Systems  Review of Systems   Constitutional:  Positive for fever (occasional). Negative for chills.   HENT:  Positive for congestion and ear pain. Negative for ear discharge, rhinorrhea and sore throat.    Respiratory:  Positive for cough, chest tightness, shortness of breath and wheezing.                                   Objective    Vitals:    01/13/25 1155   BP: 152/84   Pulse: 95   Resp: 16   Temp: 36.3 °C (97.4 °F)   SpO2: 97%     No LMP recorded.    Physical Exam  Vitals reviewed.   Constitutional:       Appearance: She is ill-appearing.   HENT:      Head: Normocephalic.      Right Ear: Ear canal normal. Tympanic membrane is erythematous and bulging.      Left Ear: Tympanic membrane and ear canal normal.      Nose: Congestion present. No rhinorrhea.      Mouth/Throat:      Mouth: Mucous membranes are moist.      Pharynx: No oropharyngeal exudate or posterior oropharyngeal erythema.   Cardiovascular:      Rate and Rhythm: Normal rate and regular rhythm.      Heart sounds: Normal heart sounds.   Pulmonary:      Effort: Pulmonary effort is normal. No respiratory distress.      Comments: Diffuse late expiratory wheezing, right lower lung has expiratory crackles        Procedures    Point of Care Test & Imaging Results from this visit  No results found for this visit on 01/13/25.   No results found.    Diagnostic study results (if any) were reviewed by Nancy Davison PA-C.    Assessment/Plan   Allergies, medications, history, and pertinent labs/EKGs/Imaging reviewed by Nancy Davison  PHILLIP.     Medical Decision Making  MDM- Patient presents with signs and symptoms consistent with suspected walking pneumonia and right otitis media. Patient was offered chest xray for further evaluation, though declined.  No evidence of sepsis or acute respiratory distress at this time. Will treat with appropriate antibiotics for age group/risk factors and current mycoplasma outbreak in area. Patient is advised if symptoms change or worsen go to ED for further evaluation and care. Otherwise follow-up with family doctor for recheck within 5-7 days. Patient verbalized understanding and agrees with plan.     Orders and Diagnoses  Diagnoses and all orders for this visit:  Right otitis media, unspecified otitis media type  -     azithromycin (Zithromax) 250 mg tablet; Take 2 tabs (500 mg) by mouth today, than 1 daily for 4 days.  Walking pneumonia  -     azithromycin (Zithromax) 250 mg tablet; Take 2 tabs (500 mg) by mouth today, than 1 daily for 4 days.      Medical Admin Record      Patient disposition: Home    Electronically signed by Nancy Davison PA-C  12:07 PM

## 2025-01-16 ENCOUNTER — APPOINTMENT (OUTPATIENT)
Dept: PRIMARY CARE | Facility: CLINIC | Age: 40
End: 2025-01-16

## 2025-01-16 VITALS
TEMPERATURE: 98.3 F | SYSTOLIC BLOOD PRESSURE: 116 MMHG | HEART RATE: 95 BPM | BODY MASS INDEX: 42.15 KG/M2 | WEIGHT: 253 LBS | HEIGHT: 65 IN | DIASTOLIC BLOOD PRESSURE: 64 MMHG | OXYGEN SATURATION: 98 %

## 2025-01-16 DIAGNOSIS — F41.9 ANXIETY AND DEPRESSION: ICD-10-CM

## 2025-01-16 DIAGNOSIS — F32.A ANXIETY AND DEPRESSION: ICD-10-CM

## 2025-01-16 DIAGNOSIS — G43.109 MIGRAINE WITH AURA AND WITHOUT STATUS MIGRAINOSUS, NOT INTRACTABLE: ICD-10-CM

## 2025-01-16 DIAGNOSIS — E78.2 MIXED HYPERLIPIDEMIA: ICD-10-CM

## 2025-01-16 PROCEDURE — 3008F BODY MASS INDEX DOCD: CPT | Performed by: FAMILY MEDICINE

## 2025-01-16 PROCEDURE — 99214 OFFICE O/P EST MOD 30 MIN: CPT | Performed by: FAMILY MEDICINE

## 2025-01-16 RX ORDER — VENLAFAXINE HYDROCHLORIDE 150 MG/1
150 CAPSULE, EXTENDED RELEASE ORAL DAILY
Qty: 90 CAPSULE | Refills: 1 | Status: SHIPPED | OUTPATIENT
Start: 2025-01-16 | End: 2025-07-15

## 2025-01-16 RX ORDER — TOPIRAMATE 100 MG/1
100 TABLET, FILM COATED ORAL 2 TIMES DAILY
Qty: 180 TABLET | Refills: 1 | Status: SHIPPED | OUTPATIENT
Start: 2025-01-16 | End: 2025-07-15

## 2025-01-16 RX ORDER — ROSUVASTATIN CALCIUM 40 MG/1
40 TABLET, COATED ORAL DAILY
Qty: 90 TABLET | Refills: 1 | Status: SHIPPED | OUTPATIENT
Start: 2025-01-16 | End: 2025-07-15

## 2025-01-16 NOTE — PROGRESS NOTES
Subjective   Patient ID: Moni Bui is a 39 y.o. female who presents for No chief complaint on file..    HPI Pt is here for refills of medications to treat the following medical conditions. Unless otherwise stated, these conditions are well-controlled, pt is taking medications s Rx, and there are no reported side effects to medications or other treatment: anxiety and depression, migraine, hyperlipidemia     Patient states she was seen by [facility] last week and prescribed a zpack and prednisone for bronchitis.  Today is last dose of her zpack and she has completed the prednisone.  Continues to use breathing treatments.  States she feels like she needs an additional round of prednisone.  COVID test was negative.  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      Objective   There were no vitals taken for this visit.    Physical Exam  Constitutional:       Appearance: Normal appearance.   HENT:      Mouth/Throat:      Mouth: Mucous membranes are moist.   Eyes:      Conjunctiva/sclera: Conjunctivae normal.   Cardiovascular:      Rate and Rhythm: Normal rate and regular rhythm.   Pulmonary:      Effort: Pulmonary effort is normal.      Breath sounds: Normal breath sounds.   Abdominal:      Palpations: Abdomen is soft.   Musculoskeletal:         General: Normal range of motion.   Skin:     General: Skin is warm and dry.   Neurological:      Mental Status: She is alert and oriented to person, place, and time.   Psychiatric:         Mood and Affect: Mood normal.         Assessment/Plan   Diagnoses and all orders for this visit:  Mixed hyperlipidemia  -     rosuvastatin (Crestor) 40 mg tablet; Take 1 tablet (40 mg) by mouth once daily.  -     Comprehensive metabolic panel; Future  Migraine with aura and without status migrainosus, not intractable  -     topiramate (Topamax)  100 mg tablet; Take 1 tablet (100 mg) by mouth 2 times a day.  Anxiety and depression  -     venlafaxine XR (Effexor-XR) 150 mg 24 hr capsule; Take 1 capsule (150 mg) by mouth once daily. Take with food.

## 2025-01-17 DIAGNOSIS — J20.9 ACUTE BRONCHITIS, UNSPECIFIED ORGANISM: Primary | ICD-10-CM

## 2025-01-17 RX ORDER — METHYLPREDNISOLONE 4 MG/1
TABLET ORAL
Qty: 21 TABLET | Refills: 0 | Status: SHIPPED | OUTPATIENT
Start: 2025-01-17 | End: 2025-01-23

## 2025-04-15 DIAGNOSIS — G43.109 MIGRAINE WITH AURA AND WITHOUT STATUS MIGRAINOSUS, NOT INTRACTABLE: ICD-10-CM

## 2025-04-15 RX ORDER — TOPIRAMATE 100 MG/1
100 TABLET, FILM COATED ORAL 2 TIMES DAILY
Qty: 180 TABLET | Refills: 1 | OUTPATIENT
Start: 2025-04-15

## 2025-06-30 ENCOUNTER — APPOINTMENT (OUTPATIENT)
Dept: PRIMARY CARE | Facility: CLINIC | Age: 40
End: 2025-06-30
Payer: COMMERCIAL

## 2025-07-02 ENCOUNTER — OFFICE VISIT (OUTPATIENT)
Dept: PRIMARY CARE | Facility: CLINIC | Age: 40
End: 2025-07-02
Payer: COMMERCIAL

## 2025-07-02 VITALS
SYSTOLIC BLOOD PRESSURE: 100 MMHG | OXYGEN SATURATION: 99 % | BODY MASS INDEX: 42.82 KG/M2 | DIASTOLIC BLOOD PRESSURE: 70 MMHG | HEART RATE: 98 BPM | HEIGHT: 65 IN | WEIGHT: 257 LBS | TEMPERATURE: 98.6 F

## 2025-07-02 DIAGNOSIS — E78.2 MIXED HYPERLIPIDEMIA: ICD-10-CM

## 2025-07-02 DIAGNOSIS — E66.01 MORBID OBESITY WITH BMI OF 40.0-44.9, ADULT (MULTI): Primary | ICD-10-CM

## 2025-07-02 DIAGNOSIS — R76.8 POSITIVE ANA (ANTINUCLEAR ANTIBODY): ICD-10-CM

## 2025-07-02 DIAGNOSIS — R76.8 POSITIVE DOUBLE STRANDED DNA ANTIBODY TEST: ICD-10-CM

## 2025-07-02 DIAGNOSIS — F32.A ANXIETY AND DEPRESSION: ICD-10-CM

## 2025-07-02 DIAGNOSIS — F41.9 ANXIETY AND DEPRESSION: ICD-10-CM

## 2025-07-02 PROCEDURE — 3008F BODY MASS INDEX DOCD: CPT | Performed by: FAMILY MEDICINE

## 2025-07-02 PROCEDURE — 99215 OFFICE O/P EST HI 40 MIN: CPT | Performed by: FAMILY MEDICINE

## 2025-07-02 RX ORDER — TIRZEPATIDE 5 MG/.5ML
5 INJECTION, SOLUTION SUBCUTANEOUS WEEKLY
Qty: 2 ML | Refills: 0
Start: 2025-07-02 | End: 2025-08-01

## 2025-07-02 RX ORDER — VENLAFAXINE HYDROCHLORIDE 150 MG/1
150 CAPSULE, EXTENDED RELEASE ORAL DAILY
Qty: 90 CAPSULE | Refills: 1 | Status: SHIPPED | OUTPATIENT
Start: 2025-07-02 | End: 2025-12-29

## 2025-07-02 RX ORDER — ROSUVASTATIN CALCIUM 40 MG/1
40 TABLET, COATED ORAL DAILY
Qty: 90 TABLET | Refills: 1 | Status: SHIPPED | OUTPATIENT
Start: 2025-07-02 | End: 2025-07-02 | Stop reason: WASHOUT

## 2025-07-02 RX ORDER — TIRZEPATIDE 7.5 MG/.5ML
7.5 INJECTION, SOLUTION SUBCUTANEOUS WEEKLY
Qty: 2 ML | Refills: 0
Start: 2025-07-02 | End: 2025-08-01

## 2025-07-02 ASSESSMENT — PATIENT HEALTH QUESTIONNAIRE - PHQ9
2. FEELING DOWN, DEPRESSED OR HOPELESS: NOT AT ALL
SUM OF ALL RESPONSES TO PHQ9 QUESTIONS 1 AND 2: 0
1. LITTLE INTEREST OR PLEASURE IN DOING THINGS: NOT AT ALL

## 2025-07-02 NOTE — PROGRESS NOTES
"Subjective   Patient ID: Moni Bui is a 40 y.o. female who presents for Obesity and Med Refill.    HPI Patient is concerned with her weight, keeps gaining weight.  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    Pt is here for refills of medications to treat the following medical conditions. Unless otherwise stated, these conditions are well-controlled, pt is taking medications as Rx, and there are no reported side effects to medications or other treatment: hyperlipidemia, anxiety, depression    Review of Systems   All other systems reviewed and are negative.      Objective   /70   Pulse 98   Temp 37 °C (98.6 °F)   Ht 1.651 m (5' 5\")   Wt 117 kg (257 lb)   SpO2 99%   BMI 42.77 kg/m²     Physical Exam  Constitutional:       Appearance: Normal appearance.   HENT:      Mouth/Throat:      Mouth: Mucous membranes are moist.   Eyes:      Conjunctiva/sclera: Conjunctivae normal.   Cardiovascular:      Rate and Rhythm: Normal rate and regular rhythm.   Pulmonary:      Effort: Pulmonary effort is normal.      Breath sounds: Normal breath sounds.   Abdominal:      Palpations: Abdomen is soft.   Musculoskeletal:         General: Normal range of motion.   Skin:     General: Skin is warm and dry.   Neurological:      Mental Status: She is alert and oriented to person, place, and time.   Psychiatric:         Mood and Affect: Mood normal.       Assessment/Plan   Diagnoses and all orders for this visit:  Morbid obesity with BMI of 40.0-44.9, adult (Multi)  - Discussed options/diet  -     tirzepatide (Mounjaro) 7.5 mg/0.5 mL pen injector; Inject 7.5 mg under the skin 1 (one) time per week.  -     tirzepatide (Mounjaro) 5 mg/0.5 mL pen injector; Inject 5 mg under the skin 1 (one) time per week.  Anxiety and depression  -     venlafaxine XR (Effexor-XR) 150 mg 24 hr capsule; Take 1 capsule (150 mg) by mouth once daily. Take with food.  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 She has polyarthritis, iridocyclitis, skin issues -- previous rhem advised that she \"didn't understand why pt was sent to her and these tests are normal and don't indicate lupus.\" This is per pt Hx today. I reviewed encounter and did not really see where her positive DS DNA result was addressed. Pt would like additional explanation if this is truly a false positive. I agree -- will refer as requested.       "

## 2025-07-03 ENCOUNTER — TELEPHONE (OUTPATIENT)
Dept: PRIMARY CARE | Facility: CLINIC | Age: 40
End: 2025-07-03
Payer: COMMERCIAL

## 2025-07-03 NOTE — TELEPHONE ENCOUNTER
Per Dr. Greenberg, patient needs set up with Endocrinology for second opinion.   Referral faxed to [facility] , they will contact patient directly to schedule. Patient informed via [employer].

## 2025-07-16 DIAGNOSIS — E78.2 MIXED HYPERLIPIDEMIA: ICD-10-CM

## 2025-07-16 RX ORDER — ROSUVASTATIN CALCIUM 40 MG/1
40 TABLET, COATED ORAL DAILY
COMMUNITY
End: 2025-07-16 | Stop reason: SDUPTHER

## 2025-07-18 RX ORDER — ROSUVASTATIN CALCIUM 40 MG/1
40 TABLET, COATED ORAL DAILY
Qty: 90 TABLET | Refills: 0 | Status: SHIPPED | OUTPATIENT
Start: 2025-07-18

## 2025-09-08 ENCOUNTER — APPOINTMENT (OUTPATIENT)
Dept: PRIMARY CARE | Facility: CLINIC | Age: 40
End: 2025-09-08
Payer: COMMERCIAL

## 2025-09-23 ENCOUNTER — APPOINTMENT (OUTPATIENT)
Dept: PRIMARY CARE | Facility: CLINIC | Age: 40
End: 2025-09-23
Payer: COMMERCIAL